# Patient Record
Sex: FEMALE | Race: WHITE | NOT HISPANIC OR LATINO | URBAN - METROPOLITAN AREA
[De-identification: names, ages, dates, MRNs, and addresses within clinical notes are randomized per-mention and may not be internally consistent; named-entity substitution may affect disease eponyms.]

---

## 2018-12-08 ENCOUNTER — INPATIENT (INPATIENT)
Facility: HOSPITAL | Age: 83
LOS: 5 days | Discharge: HOME | DRG: 394 | End: 2018-12-14
Attending: INTERNAL MEDICINE | Admitting: INTERNAL MEDICINE
Payer: MEDICARE

## 2018-12-08 VITALS
HEART RATE: 97 BPM | DIASTOLIC BLOOD PRESSURE: 83 MMHG | SYSTOLIC BLOOD PRESSURE: 132 MMHG | OXYGEN SATURATION: 98 % | RESPIRATION RATE: 18 BRPM | WEIGHT: 266.1 LBS | TEMPERATURE: 97 F

## 2018-12-08 DIAGNOSIS — F03.90 UNSPECIFIED DEMENTIA WITHOUT BEHAVIORAL DISTURBANCE: ICD-10-CM

## 2018-12-08 DIAGNOSIS — K62.5 HEMORRHAGE OF ANUS AND RECTUM: ICD-10-CM

## 2018-12-08 DIAGNOSIS — E11.9 TYPE 2 DIABETES MELLITUS WITHOUT COMPLICATIONS: ICD-10-CM

## 2018-12-08 LAB
ALBUMIN SERPL ELPH-MCNC: 3.2 G/DL — LOW (ref 3.3–5)
ALP SERPL-CCNC: 108 U/L — SIGNIFICANT CHANGE UP (ref 40–120)
ALT FLD-CCNC: 11 U/L — LOW (ref 12–78)
ANION GAP SERPL CALC-SCNC: 8 MMOL/L — SIGNIFICANT CHANGE UP (ref 5–17)
AST SERPL-CCNC: 8 U/L — LOW (ref 15–37)
BASOPHILS # BLD AUTO: 0.03 K/UL — SIGNIFICANT CHANGE UP (ref 0–0.2)
BASOPHILS NFR BLD AUTO: 0.3 % — SIGNIFICANT CHANGE UP (ref 0–2)
BILIRUB SERPL-MCNC: 0.3 MG/DL — SIGNIFICANT CHANGE UP (ref 0.2–1.2)
BLD GP AB SCN SERPL QL: SIGNIFICANT CHANGE UP
BUN SERPL-MCNC: 42 MG/DL — HIGH (ref 7–23)
CALCIUM SERPL-MCNC: 8.2 MG/DL — LOW (ref 8.5–10.1)
CHLORIDE SERPL-SCNC: 109 MMOL/L — HIGH (ref 96–108)
CO2 SERPL-SCNC: 24 MMOL/L — SIGNIFICANT CHANGE UP (ref 22–31)
CREAT SERPL-MCNC: 2 MG/DL — HIGH (ref 0.5–1.3)
EOSINOPHIL # BLD AUTO: 0.13 K/UL — SIGNIFICANT CHANGE UP (ref 0–0.5)
EOSINOPHIL NFR BLD AUTO: 1.4 % — SIGNIFICANT CHANGE UP (ref 0–6)
GLUCOSE SERPL-MCNC: 138 MG/DL — HIGH (ref 70–99)
HCT VFR BLD CALC: 34 % — LOW (ref 34.5–45)
HGB BLD-MCNC: 10.8 G/DL — LOW (ref 11.5–15.5)
IMM GRANULOCYTES NFR BLD AUTO: 0.3 % — SIGNIFICANT CHANGE UP (ref 0–1.5)
INR BLD: 1.12 RATIO — SIGNIFICANT CHANGE UP (ref 0.88–1.16)
LYMPHOCYTES # BLD AUTO: 1.77 K/UL — SIGNIFICANT CHANGE UP (ref 1–3.3)
LYMPHOCYTES # BLD AUTO: 19 % — SIGNIFICANT CHANGE UP (ref 13–44)
MCHC RBC-ENTMCNC: 31.8 GM/DL — LOW (ref 32–36)
MCHC RBC-ENTMCNC: 32.8 PG — SIGNIFICANT CHANGE UP (ref 27–34)
MCV RBC AUTO: 103.3 FL — HIGH (ref 80–100)
MONOCYTES # BLD AUTO: 0.72 K/UL — SIGNIFICANT CHANGE UP (ref 0–0.9)
MONOCYTES NFR BLD AUTO: 7.7 % — SIGNIFICANT CHANGE UP (ref 2–14)
NEUTROPHILS # BLD AUTO: 6.65 K/UL — SIGNIFICANT CHANGE UP (ref 1.8–7.4)
NEUTROPHILS NFR BLD AUTO: 71.3 % — SIGNIFICANT CHANGE UP (ref 43–77)
NRBC # BLD: 0 /100 WBCS — SIGNIFICANT CHANGE UP (ref 0–0)
OB PNL STL: POSITIVE
PLATELET # BLD AUTO: 173 K/UL — SIGNIFICANT CHANGE UP (ref 150–400)
POTASSIUM SERPL-MCNC: 4.8 MMOL/L — SIGNIFICANT CHANGE UP (ref 3.5–5.3)
POTASSIUM SERPL-SCNC: 4.8 MMOL/L — SIGNIFICANT CHANGE UP (ref 3.5–5.3)
PROT SERPL-MCNC: 8.1 G/DL — SIGNIFICANT CHANGE UP (ref 6–8.3)
PROTHROM AB SERPL-ACNC: 12.8 SEC — SIGNIFICANT CHANGE UP (ref 10–12.9)
RBC # BLD: 3.29 M/UL — LOW (ref 3.8–5.2)
RBC # FLD: 14.4 % — SIGNIFICANT CHANGE UP (ref 10.3–14.5)
SODIUM SERPL-SCNC: 141 MMOL/L — SIGNIFICANT CHANGE UP (ref 135–145)
WBC # BLD: 9.33 K/UL — SIGNIFICANT CHANGE UP (ref 3.8–10.5)
WBC # FLD AUTO: 9.33 K/UL — SIGNIFICANT CHANGE UP (ref 3.8–10.5)

## 2018-12-08 PROCEDURE — 71045 X-RAY EXAM CHEST 1 VIEW: CPT | Mod: 26

## 2018-12-08 PROCEDURE — 93010 ELECTROCARDIOGRAM REPORT: CPT

## 2018-12-08 PROCEDURE — 99285 EMERGENCY DEPT VISIT HI MDM: CPT

## 2018-12-08 RX ORDER — POTASSIUM CHLORIDE 20 MEQ
20 PACKET (EA) ORAL DAILY
Qty: 0 | Refills: 0 | Status: DISCONTINUED | OUTPATIENT
Start: 2018-12-08 | End: 2018-12-08

## 2018-12-08 RX ORDER — LEVOTHYROXINE SODIUM 125 MCG
1 TABLET ORAL
Qty: 0 | Refills: 0 | COMMUNITY

## 2018-12-08 RX ORDER — DEXTROSE 50 % IN WATER 50 %
12.5 SYRINGE (ML) INTRAVENOUS ONCE
Qty: 0 | Refills: 0 | Status: DISCONTINUED | OUTPATIENT
Start: 2018-12-08 | End: 2018-12-14

## 2018-12-08 RX ORDER — MEMANTINE HYDROCHLORIDE AND DONEPEZIL HYDROCHLORIDE 21; 10 MG/1; MG/1
1 CAPSULE ORAL
Qty: 0 | Refills: 0 | COMMUNITY

## 2018-12-08 RX ORDER — FOLIC ACID 0.8 MG
1 TABLET ORAL DAILY
Qty: 0 | Refills: 0 | Status: DISCONTINUED | OUTPATIENT
Start: 2018-12-08 | End: 2018-12-14

## 2018-12-08 RX ORDER — QUETIAPINE FUMARATE 200 MG/1
50 TABLET, FILM COATED ORAL
Qty: 0 | Refills: 0 | Status: DISCONTINUED | OUTPATIENT
Start: 2018-12-08 | End: 2018-12-14

## 2018-12-08 RX ORDER — QUETIAPINE FUMARATE 200 MG/1
1 TABLET, FILM COATED ORAL
Qty: 0 | Refills: 0 | COMMUNITY

## 2018-12-08 RX ORDER — DEXTROSE 50 % IN WATER 50 %
25 SYRINGE (ML) INTRAVENOUS ONCE
Qty: 0 | Refills: 0 | Status: DISCONTINUED | OUTPATIENT
Start: 2018-12-08 | End: 2018-12-14

## 2018-12-08 RX ORDER — SODIUM CHLORIDE 9 MG/ML
1000 INJECTION, SOLUTION INTRAVENOUS
Qty: 0 | Refills: 0 | Status: DISCONTINUED | OUTPATIENT
Start: 2018-12-08 | End: 2018-12-14

## 2018-12-08 RX ORDER — TOBRAMYCIN 0.3 %
1 DROPS OPHTHALMIC (EYE)
Qty: 0 | Refills: 0 | Status: DISCONTINUED | OUTPATIENT
Start: 2018-12-08 | End: 2018-12-14

## 2018-12-08 RX ORDER — INSULIN LISPRO 100/ML
VIAL (ML) SUBCUTANEOUS
Qty: 0 | Refills: 0 | Status: DISCONTINUED | OUTPATIENT
Start: 2018-12-08 | End: 2018-12-14

## 2018-12-08 RX ORDER — FERROUS SULFATE 325(65) MG
1 TABLET ORAL
Qty: 0 | Refills: 0 | COMMUNITY

## 2018-12-08 RX ORDER — DEXTROSE 50 % IN WATER 50 %
15 SYRINGE (ML) INTRAVENOUS ONCE
Qty: 0 | Refills: 0 | Status: DISCONTINUED | OUTPATIENT
Start: 2018-12-08 | End: 2018-12-14

## 2018-12-08 RX ORDER — ALPRAZOLAM 0.25 MG
0.25 TABLET ORAL THREE TIMES A DAY
Qty: 0 | Refills: 0 | Status: DISCONTINUED | OUTPATIENT
Start: 2018-12-08 | End: 2018-12-13

## 2018-12-08 RX ORDER — ALLOPURINOL 300 MG
1 TABLET ORAL
Qty: 0 | Refills: 0 | COMMUNITY

## 2018-12-08 RX ORDER — CITALOPRAM 10 MG/1
1 TABLET, FILM COATED ORAL
Qty: 0 | Refills: 0 | COMMUNITY

## 2018-12-08 RX ORDER — GLUCAGON INJECTION, SOLUTION 0.5 MG/.1ML
1 INJECTION, SOLUTION SUBCUTANEOUS ONCE
Qty: 0 | Refills: 0 | Status: DISCONTINUED | OUTPATIENT
Start: 2018-12-08 | End: 2018-12-14

## 2018-12-08 RX ORDER — QUETIAPINE FUMARATE 200 MG/1
200 TABLET, FILM COATED ORAL AT BEDTIME
Qty: 0 | Refills: 0 | Status: DISCONTINUED | OUTPATIENT
Start: 2018-12-08 | End: 2018-12-10

## 2018-12-08 RX ORDER — DULAGLUTIDE 4.5 MG/.5ML
0 INJECTION, SOLUTION SUBCUTANEOUS
Qty: 0 | Refills: 0 | COMMUNITY

## 2018-12-08 RX ORDER — SIMVASTATIN 20 MG/1
1 TABLET, FILM COATED ORAL
Qty: 0 | Refills: 0 | COMMUNITY

## 2018-12-08 RX ORDER — LISINOPRIL 2.5 MG/1
1 TABLET ORAL
Qty: 0 | Refills: 0 | COMMUNITY

## 2018-12-08 RX ORDER — RISPERIDONE 4 MG/1
0.25 TABLET ORAL
Qty: 0 | Refills: 0 | Status: DISCONTINUED | OUTPATIENT
Start: 2018-12-08 | End: 2018-12-14

## 2018-12-08 RX ORDER — FUROSEMIDE 40 MG
1 TABLET ORAL
Qty: 0 | Refills: 0 | COMMUNITY

## 2018-12-08 RX ADMIN — RISPERIDONE 0.25 MILLIGRAM(S): 4 TABLET ORAL at 17:55

## 2018-12-08 RX ADMIN — Medication 1 DROP(S): at 17:55

## 2018-12-08 RX ADMIN — QUETIAPINE FUMARATE 200 MILLIGRAM(S): 200 TABLET, FILM COATED ORAL at 22:31

## 2018-12-08 NOTE — ED PROVIDER NOTE - OBJECTIVE STATEMENT
83 female presents to ER with daughters and home aide with report of dark blood in the stool. Patient had BM today and aide noted the blood in the toilet. Patient has dementia, poor historian, history obtained from the daughter.

## 2018-12-08 NOTE — ED ADULT NURSE REASSESSMENT NOTE - NS ED NURSE REASSESS COMMENT FT1
Plan for medicine admission. Patient made aware. Awaiting admission assessment and orders. Awaiting bed assignment. Safety maintained.

## 2018-12-08 NOTE — H&P ADULT - NSHPPHYSICALEXAM_GEN_ALL_CORE
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

## 2018-12-08 NOTE — ED ADULT NURSE NOTE - OBJECTIVE STATEMENT
83 year old female presents to the ED complaining of rectal bleeding. Patient lives at home with daughters and home aide. As per family and patient, dark blood noted in the stool today. Patient with last bowel movement today prior to arrival to ED. Abdomen noted to be distended and firm. Patient denies pain, nontender on exam. Denies nausea/vomiting. Denies fever/chills. Patient with history of dementia, poor historian. History obtained by family. Denies change in urinary pattern.

## 2018-12-08 NOTE — ED ADULT NURSE NOTE - NSIMPLEMENTINTERV_GEN_ALL_ED
Implemented All Fall Risk Interventions:  [place] to call system. Call bell, personal items and telephone within reach. Instruct patient to call for assistance. Room bathroom lighting operational. Non-slip footwear when patient is off stretcher. Physically safe environment: no spills, clutter or unnecessary equipment. Stretcher in lowest position, wheels locked, appropriate side rails in place. Provide visual cue, wrist band, yellow gown, etc. Monitor gait and stability. Monitor for mental status changes and reorient to person, place, and time. Review medications for side effects contributing to fall risk. Reinforce activity limits and safety measures with patient and family.

## 2018-12-08 NOTE — PROVIDER CONTACT NOTE (OTHER) - SITUATION
MD called to be made aware of patient home doses of Seroquil and that patient in sinus rhythm with first degree heart block. Also wanted diet order

## 2018-12-08 NOTE — PROVIDER CONTACT NOTE (OTHER) - ACTION/TREATMENT ORDERED:
1. MD gave telephone orders for Seroquil 50mg in am and lunch   2. Seroquil 200mg oral at bedtime  MD ordered to change diet and no new orders for tele result

## 2018-12-09 LAB
ANION GAP SERPL CALC-SCNC: 10 MMOL/L — SIGNIFICANT CHANGE UP (ref 5–17)
BUN SERPL-MCNC: 45 MG/DL — HIGH (ref 7–23)
CALCIUM SERPL-MCNC: 8.2 MG/DL — LOW (ref 8.5–10.1)
CHLORIDE SERPL-SCNC: 109 MMOL/L — HIGH (ref 96–108)
CO2 SERPL-SCNC: 23 MMOL/L — SIGNIFICANT CHANGE UP (ref 22–31)
CREAT SERPL-MCNC: 2.2 MG/DL — HIGH (ref 0.5–1.3)
GLUCOSE SERPL-MCNC: 98 MG/DL — SIGNIFICANT CHANGE UP (ref 70–99)
HBA1C BLD-MCNC: 6.3 % — HIGH (ref 4–5.6)
HCT VFR BLD CALC: 32.6 % — LOW (ref 34.5–45)
HGB BLD-MCNC: 10.3 G/DL — LOW (ref 11.5–15.5)
MCHC RBC-ENTMCNC: 31.6 GM/DL — LOW (ref 32–36)
MCHC RBC-ENTMCNC: 33.1 PG — SIGNIFICANT CHANGE UP (ref 27–34)
MCV RBC AUTO: 104.8 FL — HIGH (ref 80–100)
NRBC # BLD: 0 /100 WBCS — SIGNIFICANT CHANGE UP (ref 0–0)
PLATELET # BLD AUTO: 140 K/UL — LOW (ref 150–400)
POTASSIUM SERPL-MCNC: 4.5 MMOL/L — SIGNIFICANT CHANGE UP (ref 3.5–5.3)
POTASSIUM SERPL-SCNC: 4.5 MMOL/L — SIGNIFICANT CHANGE UP (ref 3.5–5.3)
RBC # BLD: 3.11 M/UL — LOW (ref 3.8–5.2)
RBC # FLD: 14.4 % — SIGNIFICANT CHANGE UP (ref 10.3–14.5)
SODIUM SERPL-SCNC: 142 MMOL/L — SIGNIFICANT CHANGE UP (ref 135–145)
TSH SERPL-MCNC: 3.41 UIU/ML — SIGNIFICANT CHANGE UP (ref 0.36–3.74)
WBC # BLD: 7.08 K/UL — SIGNIFICANT CHANGE UP (ref 3.8–10.5)
WBC # FLD AUTO: 7.08 K/UL — SIGNIFICANT CHANGE UP (ref 3.8–10.5)

## 2018-12-09 RX ADMIN — Medication 0.25 MILLIGRAM(S): at 21:41

## 2018-12-09 RX ADMIN — QUETIAPINE FUMARATE 50 MILLIGRAM(S): 200 TABLET, FILM COATED ORAL at 12:35

## 2018-12-09 RX ADMIN — Medication 1 DROP(S): at 12:35

## 2018-12-09 RX ADMIN — Medication 1 DROP(S): at 17:31

## 2018-12-09 RX ADMIN — Medication 1 MILLIGRAM(S): at 12:35

## 2018-12-09 RX ADMIN — QUETIAPINE FUMARATE 50 MILLIGRAM(S): 200 TABLET, FILM COATED ORAL at 09:07

## 2018-12-09 RX ADMIN — Medication 0.25 MILLIGRAM(S): at 05:41

## 2018-12-09 RX ADMIN — RISPERIDONE 0.25 MILLIGRAM(S): 4 TABLET ORAL at 05:41

## 2018-12-09 RX ADMIN — Medication 1 DROP(S): at 01:55

## 2018-12-09 RX ADMIN — QUETIAPINE FUMARATE 200 MILLIGRAM(S): 200 TABLET, FILM COATED ORAL at 21:41

## 2018-12-09 RX ADMIN — RISPERIDONE 0.25 MILLIGRAM(S): 4 TABLET ORAL at 17:31

## 2018-12-09 RX ADMIN — Medication 1 DROP(S): at 05:41

## 2018-12-09 NOTE — PROGRESS NOTE ADULT - SUBJECTIVE AND OBJECTIVE BOX
pt seen on  rounds family  at  bedside  no  further  bleeding  noted lungs clear    cor  regular rate abdomen obese  extremities  no  edema labs  pending

## 2018-12-10 DIAGNOSIS — E11.21 TYPE 2 DIABETES MELLITUS WITH DIABETIC NEPHROPATHY: ICD-10-CM

## 2018-12-10 DIAGNOSIS — I15.0 RENOVASCULAR HYPERTENSION: ICD-10-CM

## 2018-12-10 DIAGNOSIS — E03.9 HYPOTHYROIDISM, UNSPECIFIED: ICD-10-CM

## 2018-12-10 DIAGNOSIS — F01.50 VASCULAR DEMENTIA WITHOUT BEHAVIORAL DISTURBANCE: ICD-10-CM

## 2018-12-10 DIAGNOSIS — N18.4 CHRONIC KIDNEY DISEASE, STAGE 4 (SEVERE): ICD-10-CM

## 2018-12-10 DIAGNOSIS — F31.12 BIPOLAR DISORDER, CURRENT EPISODE MANIC WITHOUT PSYCHOTIC FEATURES, MODERATE: ICD-10-CM

## 2018-12-10 DIAGNOSIS — M15.9 POLYOSTEOARTHRITIS, UNSPECIFIED: ICD-10-CM

## 2018-12-10 DIAGNOSIS — E11.59 TYPE 2 DIABETES MELLITUS WITH OTHER CIRCULATORY COMPLICATIONS: ICD-10-CM

## 2018-12-10 LAB
HCT VFR BLD CALC: 32.1 % — LOW (ref 34.5–45)
HGB BLD-MCNC: 10.2 G/DL — LOW (ref 11.5–15.5)
MCHC RBC-ENTMCNC: 31.8 GM/DL — LOW (ref 32–36)
MCHC RBC-ENTMCNC: 32.9 PG — SIGNIFICANT CHANGE UP (ref 27–34)
MCV RBC AUTO: 103.5 FL — HIGH (ref 80–100)
NRBC # BLD: 0 /100 WBCS — SIGNIFICANT CHANGE UP (ref 0–0)
PLATELET # BLD AUTO: 134 K/UL — LOW (ref 150–400)
RBC # BLD: 3.1 M/UL — LOW (ref 3.8–5.2)
RBC # FLD: 14.1 % — SIGNIFICANT CHANGE UP (ref 10.3–14.5)
WBC # BLD: 8.67 K/UL — SIGNIFICANT CHANGE UP (ref 3.8–10.5)
WBC # FLD AUTO: 8.67 K/UL — SIGNIFICANT CHANGE UP (ref 3.8–10.5)

## 2018-12-10 PROCEDURE — 93010 ELECTROCARDIOGRAM REPORT: CPT

## 2018-12-10 PROCEDURE — 99221 1ST HOSP IP/OBS SF/LOW 40: CPT

## 2018-12-10 RX ORDER — SODIUM CHLORIDE 9 MG/ML
1000 INJECTION INTRAMUSCULAR; INTRAVENOUS; SUBCUTANEOUS
Qty: 0 | Refills: 0 | Status: DISCONTINUED | OUTPATIENT
Start: 2018-12-10 | End: 2018-12-14

## 2018-12-10 RX ORDER — SOD SULF/SODIUM/NAHCO3/KCL/PEG
1 SOLUTION, RECONSTITUTED, ORAL ORAL EVERY 4 HOURS
Qty: 0 | Refills: 0 | Status: COMPLETED | OUTPATIENT
Start: 2018-12-11 | End: 2018-12-11

## 2018-12-10 RX ORDER — LANOLIN ALCOHOL/MO/W.PET/CERES
3 CREAM (GRAM) TOPICAL AT BEDTIME
Qty: 0 | Refills: 0 | Status: DISCONTINUED | OUTPATIENT
Start: 2018-12-10 | End: 2018-12-14

## 2018-12-10 RX ORDER — PANTOPRAZOLE SODIUM 20 MG/1
40 TABLET, DELAYED RELEASE ORAL DAILY
Qty: 0 | Refills: 0 | Status: DISCONTINUED | OUTPATIENT
Start: 2018-12-10 | End: 2018-12-14

## 2018-12-10 RX ORDER — QUETIAPINE FUMARATE 200 MG/1
100 TABLET, FILM COATED ORAL AT BEDTIME
Qty: 0 | Refills: 0 | Status: DISCONTINUED | OUTPATIENT
Start: 2018-12-10 | End: 2018-12-14

## 2018-12-10 RX ADMIN — Medication 1 DROP(S): at 17:41

## 2018-12-10 RX ADMIN — Medication 1 DROP(S): at 00:00

## 2018-12-10 RX ADMIN — Medication 1 DROP(S): at 12:06

## 2018-12-10 RX ADMIN — Medication 0.25 MILLIGRAM(S): at 06:53

## 2018-12-10 RX ADMIN — Medication 0.25 MILLIGRAM(S): at 14:27

## 2018-12-10 RX ADMIN — Medication 3 MILLIGRAM(S): at 21:39

## 2018-12-10 RX ADMIN — QUETIAPINE FUMARATE 50 MILLIGRAM(S): 200 TABLET, FILM COATED ORAL at 12:06

## 2018-12-10 RX ADMIN — Medication 1 MILLIGRAM(S): at 12:06

## 2018-12-10 RX ADMIN — QUETIAPINE FUMARATE 50 MILLIGRAM(S): 200 TABLET, FILM COATED ORAL at 09:07

## 2018-12-10 RX ADMIN — SODIUM CHLORIDE 50 MILLILITER(S): 9 INJECTION INTRAMUSCULAR; INTRAVENOUS; SUBCUTANEOUS at 17:40

## 2018-12-10 RX ADMIN — Medication 0.25 MILLIGRAM(S): at 21:39

## 2018-12-10 RX ADMIN — RISPERIDONE 0.25 MILLIGRAM(S): 4 TABLET ORAL at 06:53

## 2018-12-10 RX ADMIN — QUETIAPINE FUMARATE 100 MILLIGRAM(S): 200 TABLET, FILM COATED ORAL at 22:18

## 2018-12-10 RX ADMIN — Medication 1 DROP(S): at 06:53

## 2018-12-10 RX ADMIN — RISPERIDONE 0.25 MILLIGRAM(S): 4 TABLET ORAL at 17:41

## 2018-12-10 NOTE — PHYSICAL THERAPY INITIAL EVALUATION ADULT - ADDITIONAL COMMENTS
pt is a poor historian. Pt states she lives in a private home, was ambulating with a wheeled walker andhas stairs in her home.

## 2018-12-10 NOTE — PHYSICAL THERAPY INITIAL EVALUATION ADULT - GAIT TRAINING, PT EVAL
In 3-5 sessions pt will amb 75 feet x 2 with a rolling walker indep. and climb up and down 6 steps with one handrail independently

## 2018-12-10 NOTE — PHYSICAL THERAPY INITIAL EVALUATION ADULT - RANGE OF MOTION EXAMINATION, REHAB EVAL
bilateral lower extremity ROM was WFL (within functional limits)/bilateral upper extremity ROM was WFL (within functional limits)/limited left shoulder flexion and abduction to 45 degrees/deficits as listed below

## 2018-12-10 NOTE — CONSULT NOTE ADULT - ASSESSMENT
82 yo f pmx htn, dm, hypothyroidism, ckd, varicoses vein, dementia admitted for rectal bleeding.  Called for 2 episodes of sustained tachycardia into 130/140s early last night and this morning.  Have now resolved, HR now steady in 95s.    - Tachycardia resolved, episodes possibly related to current bleed, HR now stable, in sinus with 1st degree block, continue telemonitoring, trend H&H  - Continue to hold Plavix in setting of bleed  - No clear evidence of acute ischemia, continue statin  - Non pitting edema on left ankle/foot, has had in the past, on lasix at home, recommended restarting lasix  - No acute changes on EKG compared to previous, tele strip reviewed during episodes _____  - No previous echo done, never seen cardiologist outpt, no significant cardiac hx  - BP well controlled, continue lisinopril, monitor routine hemodynamics.  - Monitor and replete lytes, keep K>4, Mg>2.  - Pt has no active ischemia, decompensated HF, unstable arrythmia, and in the setting of low risk procedure, patient is optimized from cardiovascular standpoint for colonoscopy if planned with routine hemodynamic monitoring.   - Other cardiovascular workup will depend on clinical course.  - All other workup per primary team.  - Will follow. 84 yo f pmx htn, dm, hypothyroidism, ckd, varicoses vein, dementia admitted for rectal bleeding.  Called for 2 episodes of sustained tachycardia into 130/140s early last night and this morning.  Have now resolved, HR now steady in 95s.    - Tachycardia resolved, episodes possibly related to current bleed, HR now stable, in sinus with 1st degree block, continue telemonitoring, trend H&H  - Continue to hold Plavix in setting of bleed  - No clear evidence of acute ischemia, continue statin  - Non pitting edema on left ankle/foot, has had in the past, on lasix at home, recommended restarting lasix  - No acute changes on EKG compared to previous, tele strip reviewed during episodes of tachy  - No previous echo done, never seen cardiologist outpt, no significant cardiac hx  - BP well controlled, continue lisinopril, monitor routine hemodynamics.  - Monitor and replete lytes, keep K>4, Mg>2.  - Other cardiovascular workup will depend on clinical course.  - All other workup per primary team.  - Will follow. 84 yo f pmx htn, dm, hypothyroidism, ckd, varicoses vein, dementia admitted for rectal bleeding.  Called for 2 episodes of sustained tachycardia into 130/140s early last night and this morning.  Have now resolved, HR now steady in 95s.    - Tachycardia resolved, episodes possibly related to current bleed, HR now stable, in sinus with 1st degree block, continue telemonitoring, trend H&H  - Continue to hold Plavix in setting of bleed  - No clear evidence of acute ischemia, continue statin  - Non pitting edema on left ankle/foot, has had in the past, on lasix at home, recommended restarting lasix  - No acute changes on EKG compared to previous, tele strip reviewed during episodes of tachy  - No previous echo done, never seen cardiologist outpt, no significant cardiac hx  - BP well controlled, continue lisinopril, monitor routine hemodynamics.  - Monitor and replete lytes, keep K>4, Mg>2.  - Pt has no active ischemia, decompensated HF, unstable arrythmia, and in the setting of low risk procedure, patient is optimized from cardiovascular standpoint to proceed with planned colonoscopy with routine hemodynamic monitoring.   - Other cardiovascular workup will depend on clinical course.  - All other workup per primary team.  - Will follow. 82 yo f pmx htn, dm, hypothyroidism, ckd, varicoses vein, dementia admitted for rectal bleeding.  Called for 2 episodes of sustained tachycardia into 130/140s early last night and this morning.  Have now resolved, HR now steady in 95s.    - Tachycardia with slow uptrend/downtrend likely sinus tach, episodes possibly related to current bleed vs dehydration, continue telemonitoring, trend H&H, will add gentle IVF overnight  - Continue to hold Plavix in setting of bleed  - No clear evidence of acute ischemia, continue statin  - Non pitting edema on left ankle/foot, has had in the past, on lasix at home, recommended restarting lasix  - No acute changes on EKG compared to previous, tele strip reviewed during episodes of tachy  - No previous echo done, never seen cardiologist outpt, no other significant cardiac hx  - BP well controlled, monitor routine hemodynamics, continue lisinopril    - Monitor and replete lytes, keep K>4, Mg>2.  - Pt has no active ischemia, decompensated HF, unstable arrythmia, or obstructing valvular disease and in the setting of low risk procedure, patient is optimized from cardiovascular standpoint to proceed with planned colonoscopy with routine hemodynamic monitoring.   - Other cardiovascular workup will depend on clinical course.  - All other workup per primary team.  - Will follow. 82 yo f pmx htn, dm, hypothyroidism, ckd, varicoses vein, dementia admitted for rectal bleeding.  Called for 2 episodes of sustained tachycardia into 130/140s early last night and this morning.  Have now resolved, HR now steady in 95s.    - Tachycardia with slow uptrend/downtrend likely sinus tach, episodes possibly related to current bleed vs dehydration, continue telemonitoring, trend H&H, will add gentle IVF overnight, encourage PO intake  - Continue to hold Plavix in setting of bleed  - No clear evidence of acute ischemia, continue statin  - Non pitting edema on left ankle/foot, has had in the past, on lasix at home, continue to hold for now in setting of dehydration  - No acute changes on EKG compared to previous, tele strip reviewed during episodes of tachy  - No previous echo done, never seen cardiologist outpt, no other significant cardiac hx  - BP well controlled, monitor routine hemodynamics, continue lisinopril    - Monitor and replete lytes, keep K>4, Mg>2.  - Pt has no active ischemia, decompensated HF, unstable arrythmia, or obstructing valvular disease and in the setting of low risk procedure, patient is optimized from cardiovascular standpoint to proceed with planned colonoscopy with routine hemodynamic monitoring.   - Other cardiovascular workup will depend on clinical course.  - All other workup per primary team.  - Will follow. 82 yo f pmx htn, dm, hypothyroidism, ckd, varicoses vein, dementia admitted for rectal bleeding.  Called for 2 episodes of sustained tachycardia into 130/140s early last night and this morning.  Have now resolved, HR now steady in 95s.    - Tachycardia with slow uptrend/downtrend likely sinus tach, episodes possibly related to current bleed vs dehydration, continue telemonitoring, trend H&H, will add gentle IVF overnight, encourage PO intake  - Monitor closely for signs of volume overload  - Continue to hold Plavix in setting of bleed  - No clear evidence of acute ischemia, continue statin  - Non pitting edema on left ankle/foot, has had in the past, on lasix at home, continue to hold for now in setting of dehydration  - No acute changes on EKG compared to previous, tele strip reviewed during episodes of tachy  - No previous echo done, never seen cardiologist outpt, no other significant cardiac hx  - BP well controlled, monitor routine hemodynamics, continue lisinopril    - Monitor and replete lytes, keep K>4, Mg>2.  - Pt has no active ischemia, decompensated HF, unstable arrythmia, or obstructing valvular disease and in the setting of low risk procedure, patient is optimized from cardiovascular standpoint to proceed with planned colonoscopy with routine hemodynamic monitoring.   - Other cardiovascular workup will depend on clinical course.  - All other workup per primary team.  - Will follow.

## 2018-12-10 NOTE — PROGRESS NOTE ADULT - SUBJECTIVE AND OBJECTIVE BOX
PCP  Subjective:   in bed , awake , alert , talking alot , ate BF       Objective:   Vital Signs Last 24 Hrs  T(C): 36.7 (12-10-18 @ 05:48), Max: 37.1 (12-09-18 @ 14:01)  T(F): 98 (12-10-18 @ 05:48), Max: 98.7 (12-09-18 @ 14:01)  HR: 84 (12-10-18 @ 05:48) (84 - 99)  BP: 94/62 (12-10-18 @ 05:48) (87/60 - 106/69)  BP(mean): --  RR: 16 (12-10-18 @ 05:48) (16 - 17)  SpO2: 95% (12-10-18 @ 05:48) (90% - 95%)  Daily     Daily     GENERAL:  wdwn obese female , awake , alert responsive , but confused   EYES: eomi michele  NECK: supple no mass  CHEST/LUNG: clear , but poor air movement  HEART: s1 s2 regular   ABDOMEN:  soft nt obese , + bs  EXTREMITIES: no edema   SKIN:  warm ,   CNS:  awake , alert , non focal , confused , weak , immobility issues due to morbid obesity    Allergies: Allergies    No Known Allergies    Intolerances        Home Medications:  allopurinol 100 mg oral tablet: 1 tab(s) orally once a day (08 Dec 2018 19:32)  ALPRAZolam 0.25 mg oral tablet: 1 tab(s) orally once a day (08 Dec 2018 19:32)  citalopram 10 mg oral tablet: 1 tab(s) orally once a day (08 Dec 2018 19:32)  clopidogrel 75 mg oral tablet: 1 tab(s) orally once a day (08 Dec 2018 19:32)  ferrous sulfate (as elemental iron) 45 mg oral tablet, extended release: 1 tab(s) orally once a day (08 Dec 2018 19:32)  folic acid 1 mg oral tablet: 1 tab(s) orally once a day (08 Dec 2018 19:32)  furosemide 20 mg oral tablet: 1 tab(s) orally once a day (08 Dec 2018 19:32)  Klor-Con 20 mEq oral powder for reconstitution:  orally once a day (08 Dec 2018 19:32)  levothyroxine 100 mcg (0.1 mg) oral tablet: 1 tab(s) orally once a day (08 Dec 2018 19:32)  lisinopril 10 mg oral tablet: 1 tab(s) orally once a day (08 Dec 2018 19:32)  Namzaric 28 mg-10 mg oral capsule, extended release: 1 cap(s) orally once a day (08 Dec 2018 19:32)  QUEtiapine 200 mg oral tablet: 1 tab(s) orally once a day (at bedtime) (08 Dec 2018 19:32)  QUEtiapine 50 mg oral tablet: 1 tab(s) orally 2 times a day with breakfast and lunch (08 Dec 2018 19:32)  simvastatin 40 mg oral tablet: 1 tab(s) orally once a day (at bedtime) (08 Dec 2018 19:32)  tobramycin ophthalmic 0.3% ophthalmic solution: 1 drop(s) to each affected eye 4 times a day (08 Dec 2018 19:32)  Trulicity Pen 0.75 mg/0.5 mL subcutaneous solution: subcutaneous once a week (08 Dec 2018 19:32)  Vitamin B-100 oral tablet: 1 tab(s) orally once a day (08 Dec 2018 19:32)    Medications:   ALPRAZolam 0.25 milliGRAM(s) Oral three times a day  dextrose 40% Gel 15 Gram(s) Oral once PRN  dextrose 5%. 1000 milliLiter(s) IV Continuous <Continuous>  dextrose 50% Injectable 12.5 Gram(s) IV Push once  dextrose 50% Injectable 25 Gram(s) IV Push once  dextrose 50% Injectable 25 Gram(s) IV Push once  folic acid 1 milliGRAM(s) Oral daily  glucagon  Injectable 1 milliGRAM(s) IntraMuscular once PRN  insulin lispro (HumaLOG) corrective regimen sliding scale   SubCutaneous three times a day before meals  QUEtiapine 200 milliGRAM(s) Oral at bedtime  QUEtiapine 50 milliGRAM(s) Oral two times a day  risperiDONE   Tablet 0.25 milliGRAM(s) Oral two times a day  tobramycin 0.3% Ophthalmic Solution 1 Drop(s) Both EYES four times a day      LABS:                        10.2   8.67  )-----------( 134      ( 10 Dec 2018 08:28 )             32.1     12-09    142  |  109<H>  |  45<H>  ----------------------------<  98  4.5   |  23  |  2.20<H>    Ca    8.2<L>      09 Dec 2018 09:51    TPro  8.1  /  Alb  3.2<L>  /  TBili  0.3  /  DBili  x   /  AST  8<L>  /  ALT  11<L>  /  AlkPhos  108  12-08    PT/INR - ( 08 Dec 2018 15:05 )   PT: 12.8 sec;   INR: 1.12 ratio           12-08 @ 15:05  INR 1.12        CAPILLARY BLOOD GLUCOSE      POCT Blood Glucose.: 111 mg/dL (10 Dec 2018 08:03)  POCT Blood Glucose.: 142 mg/dL (09 Dec 2018 16:29)  POCT Blood Glucose.: 148 mg/dL (09 Dec 2018 12:05)          RECENT CULTURES:

## 2018-12-10 NOTE — PROGRESS NOTE ADULT - ASSESSMENT
· Chief Complaint: The patient is a 83y Female complaining of rectal bleeding.	  · Unable to Obtain: Dementia	  · HPI Objective Statement: 83 female presents to ER with daughters and home aide with report of dark blood in the stool. Patient had BM today and aide noted the blood in the toilet. Patient has dementia, poor historian, history obtained from the daughter.	    PAST MEDICAL/SURGICAL/FAMILY/SOCIAL HISTORY:    Past Medical History:  Diabetes mellitus    Hypertension    Hypothyroidism.  obesity  Phlebitis   varicose veins     79 yo f,  , lives with 24/7 HHA and dtr helps out , has hx of obesity , diabetes mellitus type 2 , ckd stage 4,  bipolar , and anxiety , and varicouse veins , who presents with rectal bleed , no abd pain , no nausea no vomiting ,   patient is dnr and dni per [facility]ols and ACP reviewed with dtr ,   admit and monitor for gi bleed and work up

## 2018-12-10 NOTE — CONSULT NOTE ADULT - PROBLEM SELECTOR RECOMMENDATION 9
A/P  monitor in and out  ppi once a day  check cbc  transfuse as needed  may need egd and colonoscopy to be done will d/w daugther

## 2018-12-10 NOTE — CONSULT NOTE ADULT - SUBJECTIVE AND OBJECTIVE BOX
[facility]         Preeti Ortiz, Keyonna, Eugenia, Pauly, Medardo, Carrington        [phone] (office)    CHIEF COMPLAINT: Patient is a 83y old  Female who presents with a chief complaint of rectal  bleeding (10 Dec 2018 10:25)      HPI:  84 yo f pmx htn, dm, hypothyroidism, ckd, varicoses vein, dementia presents for rectal bleeding. Pt baseline dementia, alert and oriented to self, hx obtained from daughter via phone. Per daughter, pt has been on a diet pill (does not recall name) for about 5 weeks, lost weight but quickly gained it back.  Pt has full time aid at home, this past sat, aid noticed blood in the stool.  Per daughter pt has been complaining of left ankle/foot swelling, has had in past but gotten worse this past month.  Per daughter no other significant cardiac hx besides htn, never seen cardiologist outpt, does not recall echocardiogram in the past. Denies cp, sob, palpitations, abdominal pain, n/v, headaches, dizziness.      PAST MEDICAL & SURGICAL HISTORY:  Hypothyroidism  Hypertension  Diabetes mellitus  S/P knee replacement, bilateral      SOCIAL HISTORY: No active tobacco, alcohol or illicit drug use.    FAMILY HISTORY:  No pertinent family history in first degree relatives    Home Medications:  allopurinol 100 mg oral tablet: 1 tab(s) orally once a day (08 Dec 2018 19:32)  ALPRAZolam 0.25 mg oral tablet: 1 tab(s) orally once a day (08 Dec 2018 19:32)  citalopram 10 mg oral tablet: 1 tab(s) orally once a day (08 Dec 2018 19:32)  clopidogrel 75 mg oral tablet: 1 tab(s) orally once a day (08 Dec 2018 19:32)  ferrous sulfate (as elemental iron) 45 mg oral tablet, extended release: 1 tab(s) orally once a day (08 Dec 2018 19:32)  folic acid 1 mg oral tablet: 1 tab(s) orally once a day (08 Dec 2018 19:32)  furosemide 20 mg oral tablet: 1 tab(s) orally once a day (08 Dec 2018 19:32)  Klor-Con 20 mEq oral powder for reconstitution:  orally once a day (08 Dec 2018 19:32)  levothyroxine 100 mcg (0.1 mg) oral tablet: 1 tab(s) orally once a day (08 Dec 2018 19:32)  lisinopril 10 mg oral tablet: 1 tab(s) orally once a day (08 Dec 2018 19:32)  Namzaric 28 mg-10 mg oral capsule, extended release: 1 cap(s) orally once a day (08 Dec 2018 19:32)  QUEtiapine 200 mg oral tablet: 1 tab(s) orally once a day (at bedtime) (08 Dec 2018 19:32)  QUEtiapine 50 mg oral tablet: 1 tab(s) orally 2 times a day with breakfast and lunch (08 Dec 2018 19:32)  simvastatin 40 mg oral tablet: 1 tab(s) orally once a day (at bedtime) (08 Dec 2018 19:32)  tobramycin ophthalmic 0.3% ophthalmic solution: 1 drop(s) to each affected eye 4 times a day (08 Dec 2018 19:32)  Trulicity Pen 0.75 mg/0.5 mL subcutaneous solution: subcutaneous once a week (08 Dec 2018 19:32)  Vitamin B-100 oral tablet: 1 tab(s) orally once a day (08 Dec 2018 19:32)        MEDICATIONS  (STANDING):  ALPRAZolam 0.25 milliGRAM(s) Oral three times a day  dextrose 5%. 1000 milliLiter(s) (50 mL/Hr) IV Continuous <Continuous>  dextrose 50% Injectable 12.5 Gram(s) IV Push once  dextrose 50% Injectable 25 Gram(s) IV Push once  dextrose 50% Injectable 25 Gram(s) IV Push once  folic acid 1 milliGRAM(s) Oral daily  insulin lispro (HumaLOG) corrective regimen sliding scale   SubCutaneous three times a day before meals  melatonin 3 milliGRAM(s) Oral at bedtime  pantoprazole  Injectable 40 milliGRAM(s) IV Push daily  QUEtiapine 50 milliGRAM(s) Oral two times a day  QUEtiapine 100 milliGRAM(s) Oral at bedtime  risperiDONE   Tablet 0.25 milliGRAM(s) Oral two times a day  tobramycin 0.3% Ophthalmic Solution 1 Drop(s) Both EYES four times a day    MEDICATIONS  (PRN):  dextrose 40% Gel 15 Gram(s) Oral once PRN Blood Glucose LESS THAN 70 milliGRAM(s)/deciliter  glucagon  Injectable 1 milliGRAM(s) IntraMuscular once PRN Glucose LESS THAN 70 milligrams/deciliter      Allergies    No Known Allergies    Intolerances        REVIEW OF SYSTEMS: Is negative for eye, ENT, GI, , allergic, dermatologic, musculoskeletal and neurologic, except as described above.    VITAL SIGNS:   Vital Signs Last 24 Hrs  T(C): 36.4 (10 Dec 2018 14:57), Max: 36.7 (10 Dec 2018 05:48)  T(F): 97.5 (10 Dec 2018 14:57), Max: 98 (10 Dec 2018 05:48)  HR: 96 (10 Dec 2018 14:57) (84 - 99)  BP: 136/72 (10 Dec 2018 14:57) (87/60 - 136/72)  BP(mean): --  RR: 17 (10 Dec 2018 14:57) (16 - 17)  SpO2: 96% (10 Dec 2018 14:57) (94% - 96%)    I&O's Summary    10 Dec 2018 07:01  -  10 Dec 2018 15:38  --------------------------------------------------------  IN: 240 mL / OUT: 0 mL / NET: 240 mL        PHYSICAL EXAM:  Constitutional: NAD, awake and alert to self, well-developed, obese  Eyes: EOMI, no oral cyanosis, conjunctivae clear, anicteric  Pulmonary: Non-labored, breath sounds are clear bilaterally, no wheezing  Cardiovascular: regular S1 and S2, normal rate. No murmur  Gastrointestinal: Bowel Sounds present, soft, nontender  Lymph: left ankle/foot +2 nonpitting edema  Neurological: Alert to self, strength and sensitivity are grossly intact  Skin: Warm, well-perfused  Psych: baseline dementia, pleasant    LABS: All Labs Reviewed:                        10.2   8.67  )-----------( 134      ( 10 Dec 2018 08:28 )             32.1                         10.3   7.08  )-----------( 140      ( 09 Dec 2018 09:51 )             32.6                         10.8   9.33  )-----------( 173      ( 08 Dec 2018 15:05 )             34.0     09 Dec 2018 09:51    142    |  109    |  45     ----------------------------<  98     4.5     |  23     |  2.20   08 Dec 2018 15:05    141    |  109    |  42     ----------------------------<  138    4.8     |  24     |  2.00     Ca    8.2        09 Dec 2018 09:51  Ca    8.2        08 Dec 2018 15:05    TPro  8.1    /  Alb  3.2    /  TBili  0.3    /  DBili  x      /  AST  8      /  ALT  11     /  AlkPhos  108    08 Dec 2018 15:05          Blood Culture:   12-08 @ 15:05  Pro Bnp 2010 12-09 @ 09:51  TSH: 3.41        EKG: < from: 12 Lead ECG (12.10.18 @ 12:53) >  Diagnosis Line Sinus tachycardia with 1st degree AV block  Low voltage QRS  Septal infarct (cited on or before 08-DEC-2018)    < end of copied text >
Chief Complaint:  Patient is a 83y old  Female who presents with a chief complaint of rectal  bleeding   · Unable to Obtain: Dementia	  · HPI Objective Statement: 83 female presents to ER with daughters and home aide with report of dark blood in the stool. Patient had BM today and aide noted the blood in the toilet. Patient has dementia, poor historian, history obtained from the daughter.	  here unsure if she ever had a colonosocpy done in the past    Allergies:  No Known Allergies      Medications:  ALPRAZolam 0.25 milliGRAM(s) Oral three times a day  dextrose 40% Gel 15 Gram(s) Oral once PRN  dextrose 5%. 1000 milliLiter(s) IV Continuous <Continuous>  dextrose 50% Injectable 12.5 Gram(s) IV Push once  dextrose 50% Injectable 25 Gram(s) IV Push once  dextrose 50% Injectable 25 Gram(s) IV Push once  folic acid 1 milliGRAM(s) Oral daily  glucagon  Injectable 1 milliGRAM(s) IntraMuscular once PRN  insulin lispro (HumaLOG) corrective regimen sliding scale   SubCutaneous three times a day before meals  QUEtiapine 200 milliGRAM(s) Oral at bedtime  QUEtiapine 50 milliGRAM(s) Oral two times a day  risperiDONE   Tablet 0.25 milliGRAM(s) Oral two times a day  tobramycin 0.3% Ophthalmic Solution 1 Drop(s) Both EYES four times a day      PMHX/PSHX:  Hypothyroidism  Hypertension  Diabetes mellitus  S/P knee replacement, bilateral      Family history:  No pertinent family history in first degree relatives      Social History: no etoh nop cigs no ivda lives with aide    ROS:     General:  No wt loss, fevers, chills, night sweats, fatigue,   Eyes:  Good vision, no reported pain  ENT:  No sore throat, pain, runny nose, dysphagia  CV:  No pain, palpitations, hypo/hypertension  Resp:  No dyspnea, cough, tachypnea, wheezing  GI:  No pain, No nausea, No vomiting, No diarrhea, No constipation, No weight loss, No fever, No pruritis, No rectal bleeding, No tarry stools, No dysphagia,  :  No pain, bleeding, incontinence, nocturia  Muscle:  No pain, weakness  Neuro:  No weakness, tingling, memory problems  Psych:  No fatigue, insomnia, mood problems, depression  Endocrine:  No polyuria, polydipsia, cold/heat intolerance  Heme:  No petechiae, ecchymosis, easy bruisability  Skin:  No rash, tattoos, scars, edema      PHYSICAL EXAM:   Vital Signs:  Vital Signs Last 24 Hrs  T(C): 36.7 (10 Dec 2018 05:48), Max: 37.1 (09 Dec 2018 14:01)  T(F): 98 (10 Dec 2018 05:48), Max: 98.7 (09 Dec 2018 14:01)  HR: 84 (10 Dec 2018 05:48) (84 - 99)  BP: 94/62 (10 Dec 2018 05:48) (87/60 - 106/69)  BP(mean): --  RR: 16 (10 Dec 2018 05:48) (16 - 17)  SpO2: 95% (10 Dec 2018 05:48) (90% - 95%)  Daily     Daily     GENERAL:  Appears stated age, well-groomed, well-nourished, no distress  HEENT:  NC/AT,  conjunctivae clear and pink, no thyromegaly, nodules, adenopathy, no JVD, sclera -anicteric  CHEST:  Full & symmetric excursion, no increased effort, breath sounds clear  HEART:  Regular rhythm, S1, S2, no murmur/rub/S3/S4, no abdominal bruit, no edema  ABDOMEN:  Soft, non-tender, non-distended, normoactive bowel sounds,  no masses ,no hepato-splenomegaly, no signs of chronic liver disease  EXTEREMITIES:  no cyanosis,clubbing or edema  SKIN:  No rash/erythema/ecchymoses/petechiae/wounds/abscess/warm/dry  NEURO:  Alert, oriented, no asterixis, no tremor, no encephalopathy    LABS:                        10.2   8.67  )-----------( 134      ( 10 Dec 2018 08:28 )             32.1     12-09    142  |  109<H>  |  45<H>  ----------------------------<  98  4.5   |  23  |  2.20<H>    Ca    8.2<L>      09 Dec 2018 09:51    TPro  8.1  /  Alb  3.2<L>  /  TBili  0.3  /  DBili  x   /  AST  8<L>  /  ALT  11<L>  /  AlkPhos  108  12-08    LIVER FUNCTIONS - ( 08 Dec 2018 15:05 )  Alb: 3.2 g/dL / Pro: 8.1 g/dL / ALK PHOS: 108 U/L / ALT: 11 U/L / AST: 8 U/L / GGT: x           PT/INR - ( 08 Dec 2018 15:05 )   PT: 12.8 sec;   INR: 1.12 ratio                 Imaging:

## 2018-12-11 ENCOUNTER — TRANSCRIPTION ENCOUNTER (OUTPATIENT)
Age: 83
End: 2018-12-11

## 2018-12-11 LAB
ANION GAP SERPL CALC-SCNC: 6 MMOL/L — SIGNIFICANT CHANGE UP (ref 5–17)
BASOPHILS # BLD AUTO: 0.03 K/UL — SIGNIFICANT CHANGE UP (ref 0–0.2)
BASOPHILS NFR BLD AUTO: 0.3 % — SIGNIFICANT CHANGE UP (ref 0–2)
BUN SERPL-MCNC: 58 MG/DL — HIGH (ref 7–23)
CALCIUM SERPL-MCNC: 8.7 MG/DL — SIGNIFICANT CHANGE UP (ref 8.5–10.1)
CHLORIDE SERPL-SCNC: 108 MMOL/L — SIGNIFICANT CHANGE UP (ref 96–108)
CO2 SERPL-SCNC: 23 MMOL/L — SIGNIFICANT CHANGE UP (ref 22–31)
CREAT SERPL-MCNC: 2.2 MG/DL — HIGH (ref 0.5–1.3)
EOSINOPHIL # BLD AUTO: 0.17 K/UL — SIGNIFICANT CHANGE UP (ref 0–0.5)
EOSINOPHIL NFR BLD AUTO: 1.9 % — SIGNIFICANT CHANGE UP (ref 0–6)
GLUCOSE SERPL-MCNC: 104 MG/DL — HIGH (ref 70–99)
HCT VFR BLD CALC: 34.2 % — LOW (ref 34.5–45)
HGB BLD-MCNC: 11 G/DL — LOW (ref 11.5–15.5)
IMM GRANULOCYTES NFR BLD AUTO: 0.4 % — SIGNIFICANT CHANGE UP (ref 0–1.5)
LYMPHOCYTES # BLD AUTO: 1.69 K/UL — SIGNIFICANT CHANGE UP (ref 1–3.3)
LYMPHOCYTES # BLD AUTO: 18.7 % — SIGNIFICANT CHANGE UP (ref 13–44)
MCHC RBC-ENTMCNC: 32.2 GM/DL — SIGNIFICANT CHANGE UP (ref 32–36)
MCHC RBC-ENTMCNC: 33.2 PG — SIGNIFICANT CHANGE UP (ref 27–34)
MCV RBC AUTO: 103.3 FL — HIGH (ref 80–100)
MONOCYTES # BLD AUTO: 0.68 K/UL — SIGNIFICANT CHANGE UP (ref 0–0.9)
MONOCYTES NFR BLD AUTO: 7.5 % — SIGNIFICANT CHANGE UP (ref 2–14)
NEUTROPHILS # BLD AUTO: 6.42 K/UL — SIGNIFICANT CHANGE UP (ref 1.8–7.4)
NEUTROPHILS NFR BLD AUTO: 71.2 % — SIGNIFICANT CHANGE UP (ref 43–77)
NRBC # BLD: 0 /100 WBCS — SIGNIFICANT CHANGE UP (ref 0–0)
PLATELET # BLD AUTO: 140 K/UL — LOW (ref 150–400)
POTASSIUM SERPL-MCNC: 4.9 MMOL/L — SIGNIFICANT CHANGE UP (ref 3.5–5.3)
POTASSIUM SERPL-SCNC: 4.9 MMOL/L — SIGNIFICANT CHANGE UP (ref 3.5–5.3)
RBC # BLD: 3.31 M/UL — LOW (ref 3.8–5.2)
RBC # FLD: 14 % — SIGNIFICANT CHANGE UP (ref 10.3–14.5)
SODIUM SERPL-SCNC: 137 MMOL/L — SIGNIFICANT CHANGE UP (ref 135–145)
WBC # BLD: 9.03 K/UL — SIGNIFICANT CHANGE UP (ref 3.8–10.5)
WBC # FLD AUTO: 9.03 K/UL — SIGNIFICANT CHANGE UP (ref 3.8–10.5)

## 2018-12-11 PROCEDURE — 99233 SBSQ HOSP IP/OBS HIGH 50: CPT

## 2018-12-11 PROCEDURE — 93010 ELECTROCARDIOGRAM REPORT: CPT

## 2018-12-11 RX ORDER — METOPROLOL TARTRATE 50 MG
5 TABLET ORAL EVERY 6 HOURS
Qty: 0 | Refills: 0 | Status: DISCONTINUED | OUTPATIENT
Start: 2018-12-11 | End: 2018-12-13

## 2018-12-11 RX ORDER — METOPROLOL TARTRATE 50 MG
25 TABLET ORAL EVERY 12 HOURS
Qty: 0 | Refills: 0 | Status: DISCONTINUED | OUTPATIENT
Start: 2018-12-11 | End: 2018-12-11

## 2018-12-11 RX ADMIN — Medication 5 MILLIGRAM(S): at 14:32

## 2018-12-11 RX ADMIN — Medication 0.25 MILLIGRAM(S): at 05:34

## 2018-12-11 RX ADMIN — Medication 1 LITER(S): at 17:29

## 2018-12-11 RX ADMIN — Medication 1 DROP(S): at 05:34

## 2018-12-11 RX ADMIN — Medication 1 LITER(S): at 21:39

## 2018-12-11 RX ADMIN — QUETIAPINE FUMARATE 50 MILLIGRAM(S): 200 TABLET, FILM COATED ORAL at 11:36

## 2018-12-11 RX ADMIN — QUETIAPINE FUMARATE 50 MILLIGRAM(S): 200 TABLET, FILM COATED ORAL at 07:50

## 2018-12-11 RX ADMIN — Medication 5 MILLIGRAM(S): at 09:07

## 2018-12-11 RX ADMIN — Medication 0.25 MILLIGRAM(S): at 14:32

## 2018-12-11 RX ADMIN — Medication 10 MILLIGRAM(S): at 17:21

## 2018-12-11 RX ADMIN — Medication 3 MILLIGRAM(S): at 22:09

## 2018-12-11 RX ADMIN — RISPERIDONE 0.25 MILLIGRAM(S): 4 TABLET ORAL at 05:34

## 2018-12-11 RX ADMIN — RISPERIDONE 0.25 MILLIGRAM(S): 4 TABLET ORAL at 17:21

## 2018-12-11 RX ADMIN — QUETIAPINE FUMARATE 100 MILLIGRAM(S): 200 TABLET, FILM COATED ORAL at 21:50

## 2018-12-11 RX ADMIN — Medication 10 MILLIGRAM(S): at 21:50

## 2018-12-11 RX ADMIN — Medication 1 DROP(S): at 11:36

## 2018-12-11 RX ADMIN — PANTOPRAZOLE SODIUM 40 MILLIGRAM(S): 20 TABLET, DELAYED RELEASE ORAL at 11:35

## 2018-12-11 RX ADMIN — Medication 1 MILLIGRAM(S): at 11:36

## 2018-12-11 RX ADMIN — Medication 1 DROP(S): at 17:20

## 2018-12-11 RX ADMIN — Medication 1: at 12:24

## 2018-12-11 RX ADMIN — Medication 0.25 MILLIGRAM(S): at 21:50

## 2018-12-11 RX ADMIN — Medication 1 DROP(S): at 01:55

## 2018-12-11 RX ADMIN — Medication 5 MILLIGRAM(S): at 22:07

## 2018-12-11 NOTE — PROGRESS NOTE ADULT - SUBJECTIVE AND OBJECTIVE BOX
[facility] -- Preeti Ortiz, Eugenia Newby, Medardo Coats Savella  Office # [phone]    Follow Up:  Tachycardia/Preop Eval    Subjective/Observations: Awake and alert, denies CP or palpitations.  Denies SOB or JONES.  Denies dizziness, lightheadedness, or bloody stools.    REVIEW OF SYSTEMS: All other review of systems is negative unless indicated above    PAST MEDICAL & SURGICAL HISTORY:  Hypothyroidism  Hypertension  Diabetes mellitus  S/P knee replacement, bilateral    MEDICATIONS  (STANDING):  ALPRAZolam 0.25 milliGRAM(s) Oral three times a day  bisacodyl 10 milliGRAM(s) Oral every 4 hours  dextrose 5%. 1000 milliLiter(s) (50 mL/Hr) IV Continuous <Continuous>  dextrose 50% Injectable 12.5 Gram(s) IV Push once  dextrose 50% Injectable 25 Gram(s) IV Push once  dextrose 50% Injectable 25 Gram(s) IV Push once  folic acid 1 milliGRAM(s) Oral daily  insulin lispro (HumaLOG) corrective regimen sliding scale   SubCutaneous three times a day before meals  melatonin 3 milliGRAM(s) Oral at bedtime  pantoprazole  Injectable 40 milliGRAM(s) IV Push daily  polyethylene glycol/electrolyte Solution 1 Liter(s) Oral every 4 hours  QUEtiapine 50 milliGRAM(s) Oral two times a day  QUEtiapine 100 milliGRAM(s) Oral at bedtime  risperiDONE   Tablet 0.25 milliGRAM(s) Oral two times a day  sodium chloride 0.9%. 1000 milliLiter(s) (50 mL/Hr) IV Continuous <Continuous>  tobramycin 0.3% Ophthalmic Solution 1 Drop(s) Both EYES four times a day    MEDICATIONS  (PRN):  dextrose 40% Gel 15 Gram(s) Oral once PRN Blood Glucose LESS THAN 70 milliGRAM(s)/deciliter  glucagon  Injectable 1 milliGRAM(s) IntraMuscular once PRN Glucose LESS THAN 70 milligrams/deciliter    Allergies    No Known Allergies    Intolerances    Vital Signs Last 24 Hrs  T(C): 36.7 (11 Dec 2018 04:51), Max: 36.7 (11 Dec 2018 04:05)  T(F): 98 (11 Dec 2018 04:51), Max: 98 (11 Dec 2018 04:05)  HR: 96 (11 Dec 2018 04:51) (82 - 127)  BP: 122/74 (11 Dec 2018 04:51) (109/74 - 136/72)  BP(mean): --  RR: 17 (11 Dec 2018 04:51) (17 - 18)  SpO2: 96% (11 Dec 2018 04:51) (89% - 98%)    I&O's Summary    10 Dec 2018 07:01  -  11 Dec 2018 07:00  --------------------------------------------------------  IN: 980 mL / OUT: 0 mL / NET: 980 mL    PHYSICAL EXAM:  TELE: Sinus rhythm/sinus tach, Aflutter?  Constitutional: NAD, awake and alert, obese  HEENT: Moist Mucous Membranes, Anicteric  Pulmonary: Non-labored, breath sounds are clear bilaterally, No wheezing, rales or rhonchi  Cardiovascular: Regular, S1 and S2, No murmurs, rubs, gallops or clicks  Gastrointestinal: Bowel Sounds present, soft, nontender.   Lymph: No peripheral edema. No lymphadenopathy.  Skin: No visible rashes or ulcers.  Psych:  Mood & affect appropriate    LABS: All Labs Reviewed:                        10.2   8.67  )-----------( 134      ( 10 Dec 2018 08:28 )             32.1                         10.3   7.08  )-----------( 140      ( 09 Dec 2018 09:51 )             32.6                         10.8   9.33  )-----------( 173      ( 08 Dec 2018 15:05 )             34.0     09 Dec 2018 09:51    142    |  109    |  45     ----------------------------<  98     4.5     |  23     |  2.20   08 Dec 2018 15:05    141    |  109    |  42     ----------------------------<  138    4.8     |  24     |  2.00     Ca    8.2        09 Dec 2018 09:51  Ca    8.2        08 Dec 2018 15:05    TPro  8.1    /  Alb  3.2    /  TBili  0.3    /  DBili  x      /  AST  8      /  ALT  11     /  AlkPhos  108    08 Dec 2018 15:05    < from: Xray Chest 1 View- PORTABLE-Urgent (12.08.18 @ 14:42) >    EXAM:  XR CHEST PORTABLE URGENT 1V                          PROCEDURE DATE:  12/08/2018      INTERPRETATION:  Clinical History: Rectal bleed.    Comparison: None.      Findings:  The heart and mediastinal contours are normal. No segmental   infiltrates or effusions are observed. No pneumothorax is noted.       IMPRESSION:   1. Negative Chest.    DALLAS LEMONS M.D., ATTENDING RADIOLOGIST  This document has been electronically signed. Dec  8 2018  3:03PM      < end of copied text >

## 2018-12-11 NOTE — CHART NOTE - NSCHARTNOTEFT_GEN_A_CORE
Called by RN because patient refusing bowel prep. Patient is scheduled for EGD/colonoscopy tomorrow for GIB. Patient seen at bedside, sleeping. I explained importance of bowel prep and procedure. Approximately 3/4 of Moviprep remained in bottle. Patient stated she already drank whole bottle. Became angry, shouted and refused to drink more.

## 2018-12-11 NOTE — CHART NOTE - NSCHARTNOTEFT_GEN_A_CORE
Called by RN because patient tachycardic to 130s on tele monitor. 84 yo f pmx htn, dm, hypothyroidism, ckd, varicoses vein, dementia presents for rectal bleeding. Patient asymptomatic. RN took new vitals, revealed O2 sat 89 on RA, , temp and BP WNL    Physical Exam:  General: No Acute Distress  HEENT: Normocephallic Atraumatic  Neurology: nonfocal  Respiratory: Clear To Auscultation B/L, No Wheezes, rhonchi or rales  CV: Regular tachycardic, +S1/S2, no murmurs, rubs or gallops    A/P 84 yo female PMH HTN, DM. hypothyroid, CKD, dementia here for rectal bleeding, now tachycardic with low O2 sat  Patient placed on NC, O2 sat justo to 96 percent  HR in 120s  asymptomatic  patient being followed by cardiology, Dr. Coats  H/H stable at 10.3 yesterday    Mg and phos ordered for AM  continue NC  follow up AM H/H  continue gentle hydration  will follow

## 2018-12-11 NOTE — PROGRESS NOTE ADULT - SUBJECTIVE AND OBJECTIVE BOX
PCP  Subjective:   in bed , awake , anxious , hyper , but aware of her plans for colonoscopy ,    HR sinus tachy ,     Objective:   Vital Signs Last 24 Hrs  T(C): 36.7 (12-11-18 @ 04:51), Max: 36.7 (12-11-18 @ 04:05)  T(F): 98 (12-11-18 @ 04:51), Max: 98 (12-11-18 @ 04:05)  HR: 96 (12-11-18 @ 04:51) (82 - 127)  BP: 122/74 (12-11-18 @ 04:51) (109/74 - 136/72)  BP(mean): --  RR: 17 (12-11-18 @ 04:51) (17 - 18)  SpO2: 96% (12-11-18 @ 04:51) (89% - 98%)  Daily     Daily     GENERAL:  wdwn obese female , awake , alert responsive , but confused , mild sob   EYES: eomi michele  NECK: supple no mass  CHEST/LUNG: clear , but poor air movement  HEART: s1 s2 tachycardic   ABDOMEN:  soft nt obese , + bs, obese ,   EXTREMITIES: no edema   SKIN:  warm ,   CNS:  awake , alert , non focal , confused , weak , immobility issues due to morbid obesity  MSK:  able to move and ambulate with walker ,       Allergies: Allergies    No Known Allergies    Intolerances        Home Medications:  allopurinol 100 mg oral tablet: 1 tab(s) orally once a day (08 Dec 2018 19:32)  ALPRAZolam 0.25 mg oral tablet: 1 tab(s) orally once a day (08 Dec 2018 19:32)  citalopram 10 mg oral tablet: 1 tab(s) orally once a day (08 Dec 2018 19:32)  clopidogrel 75 mg oral tablet: 1 tab(s) orally once a day (08 Dec 2018 19:32)  ferrous sulfate (as elemental iron) 45 mg oral tablet, extended release: 1 tab(s) orally once a day (08 Dec 2018 19:32)  folic acid 1 mg oral tablet: 1 tab(s) orally once a day (08 Dec 2018 19:32)  furosemide 20 mg oral tablet: 1 tab(s) orally once a day (08 Dec 2018 19:32)  Klor-Con 20 mEq oral powder for reconstitution:  orally once a day (08 Dec 2018 19:32)  levothyroxine 100 mcg (0.1 mg) oral tablet: 1 tab(s) orally once a day (08 Dec 2018 19:32)  lisinopril 10 mg oral tablet: 1 tab(s) orally once a day (08 Dec 2018 19:32)  Namzaric 28 mg-10 mg oral capsule, extended release: 1 cap(s) orally once a day (08 Dec 2018 19:32)  QUEtiapine 200 mg oral tablet: 1 tab(s) orally once a day (at bedtime) (08 Dec 2018 19:32)  QUEtiapine 50 mg oral tablet: 1 tab(s) orally 2 times a day with breakfast and lunch (08 Dec 2018 19:32)  simvastatin 40 mg oral tablet: 1 tab(s) orally once a day (at bedtime) (08 Dec 2018 19:32)  tobramycin ophthalmic 0.3% ophthalmic solution: 1 drop(s) to each affected eye 4 times a day (08 Dec 2018 19:32)  Trulicity Pen 0.75 mg/0.5 mL subcutaneous solution: subcutaneous once a week (08 Dec 2018 19:32)  Vitamin B-100 oral tablet: 1 tab(s) orally once a day (08 Dec 2018 19:32)    Medications:   ALPRAZolam 0.25 milliGRAM(s) Oral three times a day  bisacodyl 10 milliGRAM(s) Oral every 4 hours  dextrose 40% Gel 15 Gram(s) Oral once PRN  dextrose 5%. 1000 milliLiter(s) IV Continuous <Continuous>  dextrose 50% Injectable 12.5 Gram(s) IV Push once  dextrose 50% Injectable 25 Gram(s) IV Push once  dextrose 50% Injectable 25 Gram(s) IV Push once  folic acid 1 milliGRAM(s) Oral daily  glucagon  Injectable 1 milliGRAM(s) IntraMuscular once PRN  insulin lispro (HumaLOG) corrective regimen sliding scale   SubCutaneous three times a day before meals  melatonin 3 milliGRAM(s) Oral at bedtime  metoprolol tartrate 25 milliGRAM(s) Oral every 12 hours  metoprolol tartrate Injectable 5 milliGRAM(s) IV Push every 6 hours  pantoprazole  Injectable 40 milliGRAM(s) IV Push daily  polyethylene glycol/electrolyte Solution 1 Liter(s) Oral every 4 hours  QUEtiapine 50 milliGRAM(s) Oral two times a day  QUEtiapine 100 milliGRAM(s) Oral at bedtime  risperiDONE   Tablet 0.25 milliGRAM(s) Oral two times a day  sodium chloride 0.9%. 1000 milliLiter(s) IV Continuous <Continuous>  tobramycin 0.3% Ophthalmic Solution 1 Drop(s) Both EYES four times a day      LABS:                        11.0   9.03  )-----------( 140      ( 11 Dec 2018 08:29 )             34.2     12-11    137  |  108  |  58<H>  ----------------------------<  104<H>  4.9   |  23  |  2.20<H>    Ca    8.7      11 Dec 2018 08:29        12-08 @ 15:05  INR 1.12        CAPILLARY BLOOD GLUCOSE      POCT Blood Glucose.: 123 mg/dL (11 Dec 2018 08:03)  POCT Blood Glucose.: 111 mg/dL (10 Dec 2018 22:26)  POCT Blood Glucose.: 119 mg/dL (10 Dec 2018 16:40)  POCT Blood Glucose.: 119 mg/dL (10 Dec 2018 11:36)          RECENT CULTURES:

## 2018-12-11 NOTE — PROGRESS NOTE ADULT - ASSESSMENT
· Chief Complaint: The patient is a 83y Female complaining of rectal bleeding.	  · Unable to Obtain: Dementia	  · HPI Objective Statement: 83 female presents to ER with daughters and home aide with report of dark blood in the stool. Patient had BM today and aide noted the blood in the toilet. Patient has dementia, poor historian, history obtained from the daughter.	    PAST MEDICAL/SURGICAL/FAMILY/SOCIAL HISTORY:    Past Medical History:  Diabetes mellitus    Hypertension    Hypothyroidism.  obesity  Phlebitis   varicose veins     79 yo f,  , lives with 24/7 HHA and dtr helps out , has hx of obesity , diabetes mellitus type 2 , ckd stage 4,  bipolar , and anxiety , and varicose veins , who presents with rectal bleed , no abd pain , no nausea no vomiting ,   patient is dnr and dni per mols and ACP reviewed with dtr ,   admit and monitor for gi bleed and work up  on 12/11/18 patient ate BF , and feels ok over all , denies cp , mild sob , patient is tachycardic and order written for lopressor 5 mg , and nurse said that residents were not available to administer and I administered it slow iv push , checked with Telemetry and tachycardia broke at 9:12 am immediately after iv push lopressor and in NSR at about 80 bpm ,  plan colonoscopy , dtr aware , may need rehab , cardiology to f/u for HR issues ,

## 2018-12-11 NOTE — PROGRESS NOTE ADULT - PROBLEM SELECTOR PLAN 1
fobt +  no s/s overt gib overnight  trend h/h, transfuse prn  hold ac asa nsaids  ppi once a day  if w active gib check cta/bleeding scan  plan for egd/colon tomorrow, clears today, prep as ordered, npo p mn, will need medical and cardiac clearance, d/w daughter/pt

## 2018-12-11 NOTE — DIETITIAN INITIAL EVALUATION ADULT. - OTHER INFO
unable to obtain history from this patient with history dementia. family not at bedside. patient with 24/7 aide from home . for colonoscopy and EGD 12/12 ,patient with class 3 obesity not a candidate for education at this time will await family for review of weight reduction tips.

## 2018-12-11 NOTE — DIETITIAN INITIAL EVALUATION ADULT. - SIGNS/SYMPTOMS
as evidenced by rectal bleed, patient on clear liquids as evidenced by BMI obesity grade 3 (47.1) and estimated excessive energy intake

## 2018-12-11 NOTE — DIETITIAN INITIAL EVALUATION ADULT. - NS AS NUTRI INTERV MEALS SNACK
after procedure advance diet as appropriate/Mineral - modified diet/Other (specify)/Fat - modified diet/Carbohydrate - modified diet

## 2018-12-11 NOTE — PROGRESS NOTE ADULT - ASSESSMENT
82 yo f pmx htn, dm, hypothyroidism, ckd, varicoses vein, dementia admitted for rectal bleeding.  Called for 2 episodes of sustained tachycardia into 130/140s early last night and this morning.  Have now resolved, HR now steady in 95s.    - With episodes of tachycardia overnight up to 120's.  Unable to clearly determine rhythm from tele strips, however, I am leaning towards sinus tach with 1st degree AVB with the p wave coming right after the T wave but could be 1:1 Aflutter.  Please obtain 12 lead EKG while patient is tachycardic to better elucidate rhythm.  If in fact, patient is in Aflutter or Afib, having [id number] score of 5, she will need AC for thromboembolic prevention after her GI issue is resolved and when she is cleared by GI.  For now, may start low dose BB for rate control  - Continue to monitor on tele  - Unsure why she is on Plavix, but may continue to hold Plavix in setting of bleed.  GI following, may need EGD/colonoscopy  - No clear evidence of acute ischemia or volume overload, continue statin  - Non pitting edema on left ankle/foot, has had in the past, on lasix at home, continue to hold for now in setting of dehydration  - No acute changes on EKG compared to previous  - No previous echo done, never seen cardiologist outpt, no other significant cardiac hx  - Agree to hold ACEI for episodes of hypotension.  May reintroduce when more stable  - Monitor and replete lytes, keep K>4, Mg>2.  - Pt has no active ischemia, decompensated HF, unstable arrythmia, or obstructing valvular disease and in the setting of low risk procedure, patient is optimized from cardiovascular standpoint to proceed with planned colonoscopy with routine hemodynamic monitoring.   - Other cardiovascular workup will depend on clinical course.  - All other workup per primary team.  - Will follow.           Florence Martinez NP  Cardiology 82 yo f pmx htn, dm, hypothyroidism, ckd, varicoses vein, dementia admitted for rectal bleeding.  Called for 2 episodes of sustained tachycardia into 130/140s early last night and this morning.  Have now resolved, HR now steady in 95s.    - With episodes of tachycardia overnight up to 120's.  Unable to clearly determine rhythm from tele strips.  However, I am leaning away from sinus tach and more toward atrial flutter with 2:1 conduction, or sustained reentrant SVT.  Please obtain 12 lead EKG while patient is tachycardic to better elucidate rhythm.  If in fact, patient is in Aflutter or Afib, having [id number] score of 5, she will need AC for thromboembolic prevention after her GI issue is resolved and when she is cleared by GI.  For now, will start low dose BB for rate control  - Continue to monitor on tele  - Unsure why she is on Plavix, but may continue to hold Plavix in setting of bleed.  GI following, planned for EGD/colonoscopy tomorrow.  Will need to evaluate her response to metoprolol and observe her heart rhythm over the next 24h, and will comment tomorrow on her candidacy for the procedure.  - No clear evidence of acute ischemia or volume overload, continue statin  - Non pitting edema on left ankle/foot, has had in the past, on lasix at home, continue to hold for now in setting of dehydration  - No acute changes on EKG compared to previous  - No previous echo done, never seen cardiologist outpt, no other significant cardiac hx  - Agree to hold ACEI for episodes of hypotension.  May reintroduce when more stable  - Monitor and replete lytes, keep K>4, Mg>2.  - Other cardiovascular workup will depend on clinical course.  - All other workup per primary team.  - Will follow.           Florence Martinez NP  Cardiology

## 2018-12-11 NOTE — PROGRESS NOTE ADULT - SUBJECTIVE AND OBJECTIVE BOX
INTERVAL HPI/OVERNIGHT EVENTS:  pt seen and examined  confused but denies abd pain  afebrile overnight labs noted  per overnight rn no s/s active gib    MEDICATIONS  (STANDING):  ALPRAZolam 0.25 milliGRAM(s) Oral three times a day  bisacodyl 10 milliGRAM(s) Oral every 4 hours  dextrose 5%. 1000 milliLiter(s) (50 mL/Hr) IV Continuous <Continuous>  dextrose 50% Injectable 12.5 Gram(s) IV Push once  dextrose 50% Injectable 25 Gram(s) IV Push once  dextrose 50% Injectable 25 Gram(s) IV Push once  folic acid 1 milliGRAM(s) Oral daily  insulin lispro (HumaLOG) corrective regimen sliding scale   SubCutaneous three times a day before meals  melatonin 3 milliGRAM(s) Oral at bedtime  metoprolol tartrate 25 milliGRAM(s) Oral every 12 hours  pantoprazole  Injectable 40 milliGRAM(s) IV Push daily  polyethylene glycol/electrolyte Solution 1 Liter(s) Oral every 4 hours  QUEtiapine 50 milliGRAM(s) Oral two times a day  QUEtiapine 100 milliGRAM(s) Oral at bedtime  risperiDONE   Tablet 0.25 milliGRAM(s) Oral two times a day  sodium chloride 0.9%. 1000 milliLiter(s) (50 mL/Hr) IV Continuous <Continuous>  tobramycin 0.3% Ophthalmic Solution 1 Drop(s) Both EYES four times a day    MEDICATIONS  (PRN):  dextrose 40% Gel 15 Gram(s) Oral once PRN Blood Glucose LESS THAN 70 milliGRAM(s)/deciliter  glucagon  Injectable 1 milliGRAM(s) IntraMuscular once PRN Glucose LESS THAN 70 milligrams/deciliter      Allergies    No Known Allergies    Intolerances        Review of Systems:  unable to obtain in entirety      Vital Signs Last 24 Hrs  T(C): 36.7 (11 Dec 2018 04:51), Max: 36.7 (11 Dec 2018 04:05)  T(F): 98 (11 Dec 2018 04:51), Max: 98 (11 Dec 2018 04:05)  HR: 96 (11 Dec 2018 04:51) (82 - 127)  BP: 122/74 (11 Dec 2018 04:51) (109/74 - 136/72)  BP(mean): --  RR: 17 (11 Dec 2018 04:51) (17 - 18)  SpO2: 96% (11 Dec 2018 04:51) (89% - 98%)    PHYSICAL EXAM:    Constitutional: NAD lying in bed  HEENT: ncat  Neck: No LAD  Respiratory: dec bs  Cardiovascular: S1 and S2, RRR  Gastrointestinal: obese soft nt nd  Extremities: edema  Vascular: 2+ peripheral pulses  Neurological: Awake alert  Skin: No rashes      LABS:                        11.0   9.03  )-----------( x        ( 11 Dec 2018 08:29 )             34.2     12-09    142  |  109<H>  |  45<H>  ----------------------------<  98  4.5   |  23  |  2.20<H>    Ca    8.2<L>      09 Dec 2018 09:51            RADIOLOGY & ADDITIONAL TESTS:

## 2018-12-12 ENCOUNTER — RESULT REVIEW (OUTPATIENT)
Age: 83
End: 2018-12-12

## 2018-12-12 ENCOUNTER — TRANSCRIPTION ENCOUNTER (OUTPATIENT)
Age: 83
End: 2018-12-12

## 2018-12-12 LAB
MAGNESIUM SERPL-MCNC: 2 MG/DL — SIGNIFICANT CHANGE UP (ref 1.6–2.6)
PHOSPHATE SERPL-MCNC: 4.3 MG/DL — SIGNIFICANT CHANGE UP (ref 2.5–4.5)

## 2018-12-12 PROCEDURE — 88305 TISSUE EXAM BY PATHOLOGIST: CPT | Mod: 26

## 2018-12-12 PROCEDURE — 99232 SBSQ HOSP IP/OBS MODERATE 35: CPT

## 2018-12-12 PROCEDURE — 88312 SPECIAL STAINS GROUP 1: CPT | Mod: 26

## 2018-12-12 RX ORDER — ALPRAZOLAM 0.25 MG
1 TABLET ORAL
Qty: 0 | Refills: 0 | COMMUNITY

## 2018-12-12 RX ORDER — PANTOPRAZOLE SODIUM 20 MG/1
1 TABLET, DELAYED RELEASE ORAL
Qty: 0 | Refills: 0 | COMMUNITY
Start: 2018-12-12

## 2018-12-12 RX ORDER — LACTULOSE 10 G/15ML
30 SOLUTION ORAL ONCE
Qty: 0 | Refills: 0 | Status: COMPLETED | OUTPATIENT
Start: 2018-12-12 | End: 2018-12-12

## 2018-12-12 RX ORDER — METOPROLOL TARTRATE 50 MG
1 TABLET ORAL
Qty: 0 | Refills: 0 | COMMUNITY

## 2018-12-12 RX ORDER — RISPERIDONE 4 MG/1
1 TABLET ORAL
Qty: 0 | Refills: 0 | COMMUNITY
Start: 2018-12-12

## 2018-12-12 RX ORDER — LANOLIN ALCOHOL/MO/W.PET/CERES
1 CREAM (GRAM) TOPICAL
Qty: 0 | Refills: 0 | COMMUNITY
Start: 2018-12-12

## 2018-12-12 RX ADMIN — Medication 5 MILLIGRAM(S): at 21:46

## 2018-12-12 RX ADMIN — RISPERIDONE 0.25 MILLIGRAM(S): 4 TABLET ORAL at 17:36

## 2018-12-12 RX ADMIN — QUETIAPINE FUMARATE 100 MILLIGRAM(S): 200 TABLET, FILM COATED ORAL at 21:42

## 2018-12-12 RX ADMIN — RISPERIDONE 0.25 MILLIGRAM(S): 4 TABLET ORAL at 06:45

## 2018-12-12 RX ADMIN — Medication 1 DROP(S): at 17:36

## 2018-12-12 RX ADMIN — Medication 0.25 MILLIGRAM(S): at 21:42

## 2018-12-12 RX ADMIN — Medication 1 DROP(S): at 13:10

## 2018-12-12 RX ADMIN — LACTULOSE 30 GRAM(S): 10 SOLUTION ORAL at 10:02

## 2018-12-12 RX ADMIN — Medication 1 DROP(S): at 06:45

## 2018-12-12 RX ADMIN — Medication 5 MILLIGRAM(S): at 10:03

## 2018-12-12 RX ADMIN — Medication 3 MILLIGRAM(S): at 21:42

## 2018-12-12 RX ADMIN — Medication 10 MILLIGRAM(S): at 06:45

## 2018-12-12 RX ADMIN — PANTOPRAZOLE SODIUM 40 MILLIGRAM(S): 20 TABLET, DELAYED RELEASE ORAL at 13:09

## 2018-12-12 RX ADMIN — Medication 0.25 MILLIGRAM(S): at 06:45

## 2018-12-12 RX ADMIN — Medication 1 DROP(S): at 23:24

## 2018-12-12 NOTE — DISCHARGE NOTE ADULT - CARE PLAN
Principal Discharge DX:	Rectal bleeding  Goal:	prepped for colonoscopy  Assessment and plan of treatment:	take PPI  Secondary Diagnosis:	Bipolar affective disorder, currently manic, moderate  Goal:	seraquel  Assessment and plan of treatment:	emotional support  Secondary Diagnosis:	CKD (chronic kidney disease), stage IV  Goal:	monitor ,  Assessment and plan of treatment:	possibly related to diabetic nephropathy and chronic issues  Secondary Diagnosis:	Diabetic nephropathy associated with type 2 diabetes mellitus  Goal:	monitor  Secondary Diagnosis:	Hypothyroidism, unspecified type  Goal:	synthroid  Secondary Diagnosis:	Type 2 diabetes mellitus with other circulatory complication  Goal:	may need januvia for now on trulicity  Secondary Diagnosis:	Renovascular hypertension  Goal:	metoprolol and lisinopril

## 2018-12-12 NOTE — DISCHARGE NOTE ADULT - PLAN OF CARE
pre[facility] for colonoscopy [facility] , possibly related to diabetic nephropathy and chronic issues monitor synthroid may need januvia for now on trulicity metoprolol and lisinopril

## 2018-12-12 NOTE — DISCHARGE NOTE ADULT - HOSPITAL COURSE
Assessment and Plan:   · Assessment		  · Chief Complaint: The patient is a 83y Female complaining of rectal bleeding.	  · Unable to Obtain: Dementia	  · HPI Objective Statement: 83 female presents to ER with daughters and home aide with report of dark blood in the stool. Patient had BM today and aide noted the blood in the toilet. Patient has dementia, poor historian, history obtained from the daughter.	    PAST MEDICAL/SURGICAL/FAMILY/SOCIAL HISTORY:    Past Medical History:  Diabetes mellitus    Hypertension    Hypothyroidism.  obesity  Phlebitis   varicose veins     79 yo f,  , lives with 24/7 HHA and dtr helps out , has hx of obesity , diabetes mellitus type 2 , ckd stage 4,  bipolar , and anxiety , and varicose veins , who presents with rectal bleed , no abd pain , no nausea no vomiting ,   patient is dnr and dni per mols and ACP reviewed with dtr ,   admit and monitor for gi bleed and work up  on 12/11/18 patient ate BF , and feels ok over all , denies cp , mild sob , patient is tachycardic and order written for lopressor 5 mg , and nurse said that residents were not available to administer and I administered it slow iv push , checked with Telemetry and tachycardia broke at 9:12 am immediately after iv push lopressor and in NSR at about 80 bpm ,  plan colonoscopy , dtr aware , may need rehab , cardiology to f/u for HR issues ,   on 12/12/18 awaiting colonoscopy , patient may not have drank the prep completely , plan dc when stable hopefully by am with HHA         Problem/Plan - 1:  ·  Problem: Rectal bleeding.  Plan: follow up cbc , gi eval.      Problem/Plan - 2:  ·  Problem: Vascular dementia without behavioral disturbance.  Plan: monitor.      Problem/Plan - 3:  ·  Problem: Renovascular hypertension.  Plan: metoprolol.      Problem/Plan - 4:  ·  Problem: Type 2 diabetes mellitus with other circulatory complication.  Plan: fs and coverage hga1c stable.      Problem/Plan - 5:  ·  Problem: Diabetic nephropathy associated with type 2 diabetes mellitus.  Plan: chronic ckd stage 4.      Problem/Plan - 6:  Problem: CKD (chronic kidney disease), stage IV. Plan: dm related.     Problem/Plan - 7:  ·  Problem: Bipolar affective disorder, currently manic, moderate.  Plan: risperidone and Seroquel.      Problem/Plan - 8:  ·  Problem: Hypothyroidism, unspecified type.  Plan: synthroid.      Problem/Plan - 9:  ·  Problem: Osteoarthritis of multiple joints, unspecified osteoarthritis type.  Plan: tylenol. Assessment and Plan:   · Assessment		  · Chief Complaint: The patient is a 83y Female complaining of rectal bleeding.	  · Unable to Obtain: Dementia	  · HPI Objective Statement: 83 female presents to ER with daughters and home aide with report of dark blood in the stool. Patient had BM today and aide noted the blood in the toilet. Patient has dementia, poor historian, history obtained from the daughter.	    PAST MEDICAL/SURGICAL/FAMILY/SOCIAL HISTORY:    Past Medical History:  Diabetes mellitus    Hypertension    Hypothyroidism.  obesity  Phlebitis   varicose veins     81 yo f,  , lives with 24/7 HHA and dtr helps out , has hx of obesity , diabetes mellitus type 2 , ckd stage 4,  bipolar , and anxiety , and varicose veins , who presents with rectal bleed , no abd pain , no nausea no vomiting ,   patient is dnr and dni per mols and ACP reviewed with dtr ,   admit and monitor for gi bleed and work up  on 12/11/18 patient ate BF , and feels ok over all , denies cp , mild sob , patient is tachycardic and order written for lopressor 5 mg , and nurse said that residents were not available to administer and I administered it slow iv push , checked with Telemetry and tachycardia broke at 9:12 am immediately after iv push lopressor and in NSR at about 80 bpm ,  plan colonoscopy , dtr aware , may need rehab , cardiology to f/u for HR issues ,   on 12/12/18 awaiting colonoscopy , patient may not have drank the prep completely , plan dc when stable hopefully by am with HHA         Problem/Plan - 1:  ·  Problem: Rectal bleeding.  Plan: follow up cbc , gi eval.      Problem/Plan - 2:  ·  Problem: Vascular dementia without behavioral disturbance.  Plan: monitor.      Problem/Plan - 3:  ·  Problem: Renovascular hypertension.  Plan: metoprolol.      Problem/Plan - 4:  ·  Problem: Type 2 diabetes mellitus with other circulatory complication.  Plan: fs and coverage hga1c stable.      Problem/Plan - 5:  ·  Problem: Diabetic nephropathy associated with type 2 diabetes mellitus.  Plan: chronic ckd stage 4.      Problem/Plan - 6:  Problem: CKD (chronic kidney disease), stage IV. Plan: dm related.     Problem/Plan - 7:  ·  Problem: Bipolar affective disorder, currently manic, moderate.  Plan: risperidone and Seroquel.      Problem/Plan - 8:  ·  Problem: Hypothyroidism, unspecified type.  Plan: synthroid.      Problem/Plan - 9:  ·  Problem: Osteoarthritis of multiple joints, unspecified osteoarthritis type.  Plan: tylenol. Assessment and Plan:   · Assessment		  · Chief Complaint: The patient is a 83y Female complaining of rectal bleeding.	  · Unable to Obtain: Dementia	  · HPI Objective Statement: 83 female presents to ER with daughters and home aide with report of dark blood in the stool. Patient had BM today and aide noted the blood in the toilet. Patient has dementia, poor historian, history obtained from the daughter.	    PAST MEDICAL/SURGICAL/FAMILY/SOCIAL HISTORY:    Past Medical History:  Diabetes mellitus    Hypertension    Hypothyroidism.  obesity  Phlebitis   varicose veins     79 yo f,  , lives with 24/7 HHA and dtr helps out , has hx of obesity , diabetes mellitus type 2 , ckd stage 4,  bipolar , and anxiety , and varicose veins , who presents with rectal bleed , no abd pain , no nausea no vomiting ,   patient is dnr and dni per mols and ACP reviewed with dtr ,   admit and monitor for gi bleed and work up  on 12/11/18 patient ate BF , and feels ok over all , denies cp , mild sob , patient is tachycardic and order written for lopressor 5 mg , and nurse said that residents were not available to administer and I administered it slow iv push , checked with Telemetry and tachycardia broke at 9:12 am immediately after iv push lopressor and in NSR at about 80 bpm ,  plan colonoscopy , dtr aware , may need rehab , cardiology to f/u for HR issues ,   on 12/12/18 awaiting colonoscopy , patient may not have drank the prep completely , plan dc when stable hopefully by am with HHA  patient had colonoscopy and egd , all stable , some hemorrhoids ,          Problem/Plan - 1:  ·  Problem: Rectal bleeding.  Plan: follow up cbc , gi eval.      Problem/Plan - 2:  ·  Problem: Vascular dementia without behavioral disturbance.  Plan: monitor.      Problem/Plan - 3:  ·  Problem: Renovascular hypertension.  Plan: metoprolol.      Problem/Plan - 4:  ·  Problem: Type 2 diabetes mellitus with other circulatory complication.  Plan: fs and coverage hga1c stable.      Problem/Plan - 5:  ·  Problem: Diabetic nephropathy associated with type 2 diabetes mellitus.  Plan: chronic ckd stage 4.      Problem/Plan - 6:  Problem: CKD (chronic kidney disease), stage IV. Plan: dm related.     Problem/Plan - 7:  ·  Problem: Bipolar affective disorder, currently manic, moderate.  Plan: risperidone and Seroquel.      Problem/Plan - 8:  ·  Problem: Hypothyroidism, unspecified type.  Plan: synthroid.      Problem/Plan - 9:  ·  Problem: Osteoarthritis of multiple joints, unspecified osteoarthritis type.  Plan: tylenol.

## 2018-12-12 NOTE — DISCHARGE NOTE ADULT - MEDICATION SUMMARY - MEDICATIONS TO TAKE
I will START or STAY ON the medications listed below when I get home from the hospital:    lisinopril 10 mg oral tablet  -- 1 tab(s) by mouth once a day  -- Indication: For Htn    citalopram 10 mg oral tablet  -- 1 tab(s) by mouth once a day  -- Indication: For Depression    Trulicity Pen 0.75 mg/0.5 mL subcutaneous solution  -- subcutaneous once a week  -- Indication: For Dm    allopurinol 100 mg oral tablet  -- 1 tab(s) by mouth once a day  -- Indication: For gout    simvastatin 40 mg oral tablet  -- 1 tab(s) by mouth once a day (at bedtime)  -- Indication: For Hyperlipidemia    clopidogrel 75 mg oral tablet  -- 1 tab(s) by mouth once a day  -- Indication: For Varicouse veins    QUEtiapine 50 mg oral tablet  -- 1 tab(s) by mouth 2 times a day with breakfast and lunch  -- Indication: For anxiety    QUEtiapine 200 mg oral tablet  -- 1 tab(s) by mouth once a day (at bedtime)  -- Indication: For anxietyand bipolar    risperiDONE 0.25 mg oral tablet  -- 1 tab(s) by mouth 2 times a day  -- Indication: For Bipolar affective disorder, currently manic, moderate    metoprolol succinate 50 mg oral tablet, extended release  -- 1 tab(s) by mouth once a day  -- Indication: For Tachycardia    Namzaric 28 mg-10 mg oral capsule, extended release  -- 1 cap(s) by mouth once a day  -- Indication: For Dementia    furosemide 20 mg oral tablet  -- 1 tab(s) by mouth once a day  -- Indication: For edema    ferrous sulfate (as elemental iron) 45 mg oral tablet, extended release  -- 1 tab(s) by mouth once a day  -- Indication: For anemia    Klor-Con 20 mEq oral powder for reconstitution  --  by mouth once a day  -- Indication: For potassium    melatonin 3 mg oral tablet  -- 1 tab(s) by mouth once a day (at bedtime)  -- Indication: For insomnia    pantoprazole 40 mg oral delayed release tablet  -- 1 tab(s) by mouth once a day  -- Indication: For gerd    levothyroxine 100 mcg (0.1 mg) oral tablet  -- 1 tab(s) by mouth once a day  -- Indication: For Hypothyroidism, unspecified type    Vitamin B-100 oral tablet  -- 1 tab(s) by mouth once a day  -- Indication: For Vitamin    folic acid 1 mg oral tablet  -- 1 tab(s) by mouth once a day  -- Indication: For Vitamin I will START or STAY ON the medications listed below when I get home from the hospital:    lisinopril 10 mg oral tablet  -- 1 tab(s) by mouth once a day  -- Indication: For Htn    citalopram 10 mg oral tablet  -- 1 tab(s) by mouth once a day  -- Indication: For Depression    Trulicity Pen 0.75 mg/0.5 mL subcutaneous solution  -- subcutaneous once a week  -- Indication: For Dm    allopurinol 100 mg oral tablet  -- 1 tab(s) by mouth once a day  -- Indication: For gout    simvastatin 40 mg oral tablet  -- 1 tab(s) by mouth once a day (at bedtime)  -- Indication: For Hyperlipidemia    clopidogrel 75 mg oral tablet  -- 1 tab(s) by mouth once a day  start after january 1st, 2019  -- Indication: For Cvaricouse veins     risperiDONE 0.25 mg oral tablet  -- 1 tab(s) by mouth 2 times a day  -- Indication: For Bipolar affective disorder, currently manic, moderate    QUEtiapine 50 mg oral tablet  -- 1 tab(s) by mouth 2 times a day with breakfast and lunch  -- Indication: For anxiety    QUEtiapine 200 mg oral tablet  -- 1 tab(s) by mouth once a day (at bedtime)  -- Indication: For anxietyand bipolar    ALPRAZolam 0.25 mg oral tablet  -- 1 tab(s) by mouth 3 times a day, As needed, anxiety  -- Indication: For anxiety    metoprolol succinate 50 mg oral tablet, extended release  -- 1 tab(s) by mouth once a day  -- Indication: For Tachycardia    metoprolol succinate 50 mg oral tablet, extended release  -- 1 tab(s) by mouth once a day  -- Indication: For Tachycardia    Namzaric 28 mg-10 mg oral capsule, extended release  -- 1 cap(s) by mouth once a day  -- Indication: For Dementia    furosemide 20 mg oral tablet  -- 1 tab(s) by mouth once a day  -- Indication: For edema    ferrous sulfate (as elemental iron) 45 mg oral tablet, extended release  -- 1 tab(s) by mouth once a day  -- Indication: For anemia    Klor-Con 20 mEq oral powder for reconstitution  --  by mouth once a day  -- Indication: For potassium    melatonin 3 mg oral tablet  -- 1 tab(s) by mouth once a day (at bedtime)  -- Indication: For insomnia    pantoprazole 40 mg oral delayed release tablet  -- 1 tab(s) by mouth once a day  -- Indication: For gerd    levothyroxine 100 mcg (0.1 mg) oral tablet  -- 1 tab(s) by mouth once a day  -- Indication: For Hypothyroidism, unspecified type    Vitamin B-100 oral tablet  -- 1 tab(s) by mouth once a day  -- Indication: For Vitamin    folic acid 1 mg oral tablet  -- 1 tab(s) by mouth once a day  -- Indication: For Vitamin I will START or STAY ON the medications listed below when I get home from the hospital:    lisinopril 10 mg oral tablet  -- 1 tab(s) by mouth once a day  -- Indication: For Htn    citalopram 10 mg oral tablet  -- 1 tab(s) by mouth once a day  -- Indication: For Depression    Trulicity Pen 0.75 mg/0.5 mL subcutaneous solution  -- subcutaneous once a week  -- Indication: For Dm    allopurinol 100 mg oral tablet  -- 1 tab(s) by mouth once a day  -- Indication: For gout    simvastatin 40 mg oral tablet  -- 1 tab(s) by mouth once a day (at bedtime)  -- Indication: For Hyperlipidemia    clopidogrel 75 mg oral tablet  -- 1 tab(s) by mouth once a day  start after 12/20/18  -- Indication: For Chronic venouse stasis phlebitis hx     QUEtiapine 50 mg oral tablet  -- 1 tab(s) by mouth 2 times a day with breakfast and lunch  -- Indication: For anxiety    QUEtiapine 200 mg oral tablet  -- 1 tab(s) by mouth once a day (at bedtime)  -- Indication: For anxietyand bipolar    risperiDONE 0.25 mg oral tablet  -- 1 tab(s) by mouth 2 times a day  -- Indication: For Bipolar affective disorder, currently manic, moderate    ALPRAZolam 0.25 mg oral tablet  -- 1 tab(s) by mouth 3 times a day, As needed, anxiety  -- Indication: For anxiety    metoprolol succinate 50 mg oral tablet, extended release  -- 1 tab(s) by mouth once a day  -- Indication: For Tachycardia    metoprolol succinate 50 mg oral tablet, extended release  -- 1 tab(s) by mouth once a day  -- Indication: For Tachycardia    Namzaric 28 mg-10 mg oral capsule, extended release  -- 1 cap(s) by mouth once a day  -- Indication: For Dementia    furosemide 20 mg oral tablet  -- 1 tab(s) by mouth once a day  -- Indication: For edema    ferrous sulfate (as elemental iron) 45 mg oral tablet, extended release  -- 1 tab(s) by mouth once a day  -- Indication: For anemia    Klor-Con 20 mEq oral powder for reconstitution  --  by mouth once a day  -- Indication: For potassium    melatonin 3 mg oral tablet  -- 1 tab(s) by mouth once a day (at bedtime)  -- Indication: For insomnia    pantoprazole 40 mg oral delayed release tablet  -- 1 tab(s) by mouth once a day  -- Indication: For gerd    levothyroxine 100 mcg (0.1 mg) oral tablet  -- 1 tab(s) by mouth once a day  -- Indication: For Hypothyroidism, unspecified type    Vitamin B-100 oral tablet  -- 1 tab(s) by mouth once a day  -- Indication: For Vitamin    folic acid 1 mg oral tablet  -- 1 tab(s) by mouth once a day  -- Indication: For Vitamin

## 2018-12-12 NOTE — DISCHARGE NOTE ADULT - MEDICATION SUMMARY - MEDICATIONS TO STOP TAKING
I will STOP taking the medications listed below when I get home from the hospital:    BuSpar  --    Januvia 50 mg oral tablet  -- 1 tab(s) by mouth once a day    metFORMIN 500 mg oral tablet  -- 1 tab(s) by mouth 2 times a day

## 2018-12-12 NOTE — PROGRESS NOTE ADULT - SUBJECTIVE AND OBJECTIVE BOX
PCP  Subjective:   in chair , awake , denies pain , confused to date ,     Objective:   Vital Signs Last 24 Hrs  T(C): 36.6 (18 @ 04:21), Max: 36.6 (18 @ 04:21)  T(F): 97.8 (18 @ 04:21), Max: 97.8 (18 @ 04:21)  HR: 79 (18 @ 04:21) (76 - 130)  BP: 108/71 (18 @ 04:21) (108/70 - 121/67)  BP(mean): 85 (18 @ 22:04) (85 - 85)  RR: 17 (18 @ 04:21) (16 - 19)  SpO2: 92% (18 @ 04:21) (92% - 97%)  Daily     Daily Weight in k.5 (12 Dec 2018 04:21)    GENERAL:  wdwn obese female , awake , alert responsive , but confused , mild sob   EYES: eomi michele  NECK: supple no mass  CHEST/LUNG: clear , but poor air movement  HEART: s1 s2 regular   ABDOMEN:  soft nt obese , + bs, obese ,   EXTREMITIES: no edema   SKIN:  warm ,   CNS:  awake , alert , non focal , confused , weak , immobility issues due to morbid obesity  MSK:  able to move and ambulate with walker ,       Allergies: Allergies    No Known Allergies    Intolerances        Home Medications:  allopurinol 100 mg oral tablet: 1 tab(s) orally once a day (08 Dec 2018 19:32)  ALPRAZolam 0.25 mg oral tablet: 1 tab(s) orally once a day (08 Dec 2018 19:32)  citalopram 10 mg oral tablet: 1 tab(s) orally once a day (08 Dec 2018 19:32)  clopidogrel 75 mg oral tablet: 1 tab(s) orally once a day (08 Dec 2018 19:32)  ferrous sulfate (as elemental iron) 45 mg oral tablet, extended release: 1 tab(s) orally once a day (08 Dec 2018 19:32)  folic acid 1 mg oral tablet: 1 tab(s) orally once a day (08 Dec 2018 19:32)  furosemide 20 mg oral tablet: 1 tab(s) orally once a day (08 Dec 2018 19:32)  Klor-Con 20 mEq oral powder for reconstitution:  orally once a day (08 Dec 2018 19:32)  levothyroxine 100 mcg (0.1 mg) oral tablet: 1 tab(s) orally once a day (08 Dec 2018 19:32)  lisinopril 10 mg oral tablet: 1 tab(s) orally once a day (08 Dec 2018 19:32)  Namzaric 28 mg-10 mg oral capsule, extended release: 1 cap(s) orally once a day (08 Dec 2018 19:32)  QUEtiapine 200 mg oral tablet: 1 tab(s) orally once a day (at bedtime) (08 Dec 2018 19:32)  QUEtiapine 50 mg oral tablet: 1 tab(s) orally 2 times a day with breakfast and lunch (08 Dec 2018 19:32)  simvastatin 40 mg oral tablet: 1 tab(s) orally once a day (at bedtime) (08 Dec 2018 19:32)  tobramycin ophthalmic 0.3% ophthalmic solution: 1 drop(s) to each affected eye 4 times a day (08 Dec 2018 19:32)  Trulicity Pen 0.75 mg/0.5 mL subcutaneous solution: subcutaneous once a week (08 Dec 2018 19:32)  Vitamin B-100 oral tablet: 1 tab(s) orally once a day (08 Dec 2018 19:32)    Medications:   ALPRAZolam 0.25 milliGRAM(s) Oral three times a day  dextrose 40% Gel 15 Gram(s) Oral once PRN  dextrose 5%. 1000 milliLiter(s) IV Continuous <Continuous>  dextrose 50% Injectable 12.5 Gram(s) IV Push once  dextrose 50% Injectable 25 Gram(s) IV Push once  dextrose 50% Injectable 25 Gram(s) IV Push once  folic acid 1 milliGRAM(s) Oral daily  glucagon  Injectable 1 milliGRAM(s) IntraMuscular once PRN  insulin lispro (HumaLOG) corrective regimen sliding scale   SubCutaneous three times a day before meals  melatonin 3 milliGRAM(s) Oral at bedtime  metoprolol tartrate Injectable 5 milliGRAM(s) IV Push every 6 hours  pantoprazole  Injectable 40 milliGRAM(s) IV Push daily  QUEtiapine 50 milliGRAM(s) Oral two times a day  QUEtiapine 100 milliGRAM(s) Oral at bedtime  risperiDONE   Tablet 0.25 milliGRAM(s) Oral two times a day  sodium chloride 0.9%. 1000 milliLiter(s) IV Continuous <Continuous>  tobramycin 0.3% Ophthalmic Solution 1 Drop(s) Both EYES four times a day      LABS:                        11.0   9.03  )-----------( 140      ( 11 Dec 2018 08:29 )             34.2         137  |  108  |  58<H>  ----------------------------<  104<H>  4.9   |  23  |  2.20<H>    Ca    8.7      11 Dec 2018 08:29        12-08 @ 15:05  INR 1.12        CAPILLARY BLOOD GLUCOSE      POCT Blood Glucose.: 125 mg/dL (12 Dec 2018 07:38)  POCT Blood Glucose.: 104 mg/dL (11 Dec 2018 16:45)  POCT Blood Glucose.: 151 mg/dL (11 Dec 2018 11:39)          RECENT CULTURES:

## 2018-12-12 NOTE — DISCHARGE NOTE ADULT - CARE PROVIDER_API CALL
Nayan Boyd), Internal Medicine  [address] [place] [address]  Phone: [phone]  Fax: [phone]

## 2018-12-12 NOTE — PROGRESS NOTE ADULT - ASSESSMENT
82 yo f pmx htn, dm, hypothyroidism, ckd, varicoses vein, dementia admitted for rectal bleeding.  Called for 2 episodes of sustained tachycardia into 130/140s early last night and this morning.  Have now resolved, HR now steady in 95s.    - With episodes of tachycardia overnight up to 120's.  Unable to clearly determine rhythm from tele strips.  However, I am leaning away from sinus tach and more toward atrial flutter with 2:1 conduction, or sustained reentrant SVT.  Please obtain 12 lead EKG while patient is tachycardic to better elucidate rhythm.  If in fact, patient is in Aflutter or Afib, having [id number] score of 5, she will need AC for thromboembolic prevention after her GI issue is resolved and when she is cleared by GI.  For now, will start low dose BB for rate control  - Continue to monitor on tele  - Unsure why she is on Plavix, but may continue to hold Plavix in setting of bleed.  GI following, planned for EGD/colonoscopy tomorrow.  Will need to evaluate her response to metoprolol and observe her heart rhythm over the next 24h, and will comment tomorrow on her candidacy for the procedure.  - No clear evidence of acute ischemia or volume overload, continue statin  - Non pitting edema on left ankle/foot, has had in the past, on lasix at home, continue to hold for now in setting of dehydration  - No acute changes on EKG compared to previous  - No previous echo done, never seen cardiologist outpt, no other significant cardiac hx  - Agree to hold ACEI for episodes of hypotension.  May reintroduce when more stable  - Monitor and replete lytes, keep K>4, Mg>2.  - Other cardiovascular workup will depend on clinical course.  - All other workup per primary team.  - Will follow. 82 yo f pmx htn, dm, hypothyroidism, ckd, varicoses vein, dementia admitted for rectal bleeding.  Called for 2 episodes of sustained tachycardia into 130/140s early last night and this morning.  Have now resolved, HR now steady in 95s.    - No further episodes of tachycardia overnight.  Unable to clearly determine rhythm from tele strips.     - Placed on IV Lopressor while NPO  - Continue to monitor on tele  - Unsure why she is on Plavix, but may continue to hold Plavix in setting of bleed.   - FOBT positive, for EDG/colonoscopy.  No contraindication for planned procedure from cardiac standpoint.  - No clear evidence of acute ischemia or volume overload  - Continue statin  - Non pitting edema on left ankle/foot, has had in the past, on lasix at home, continue to hold for now in setting of dehydration  - No acute changes on EKG compared to previous  - No previous echo done, never seen cardiologist outpt, no other significant cardiac hx  - Agree to hold ACEI for episodes of hypotension.  May reintroduce when more stable  - Monitor and replete lytes, keep K>4, Mg>2.  - Other cardiovascular workup will depend on clinical course.  - All other workup per primary team.  - Will follow.     Florence Martinez NP  Cardiology

## 2018-12-12 NOTE — DISCHARGE NOTE ADULT - PATIENT PORTAL LINK FT
You can access the [facility] Patient Portal, offered by [facility], by registering with the following website: [email or web address]

## 2018-12-12 NOTE — DISCHARGE NOTE ADULT - SECONDARY DIAGNOSIS.
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

## 2018-12-12 NOTE — PROGRESS NOTE ADULT - ASSESSMENT
· Chief Complaint: The patient is a 83y Female complaining of rectal bleeding.	  · Unable to Obtain: Dementia	  · HPI Objective Statement: 83 female presents to ER with daughters and home aide with report of dark blood in the stool. Patient had BM today and aide noted the blood in the toilet. Patient has dementia, poor historian, history obtained from the daughter.	    PAST MEDICAL/SURGICAL/FAMILY/SOCIAL HISTORY:    Past Medical History:  Diabetes mellitus    Hypertension    Hypothyroidism.  obesity  Phlebitis   varicose veins     81 yo f,  , lives with 24/7 HHA and dtr helps out , has hx of obesity , diabetes mellitus type 2 , ckd stage 4,  bipolar , and anxiety , and varicose veins , who presents with rectal bleed , no abd pain , no nausea no vomiting ,   patient is dnr and dni per mols and ACP reviewed with dtr ,   admit and monitor for gi bleed and work up  on 12/11/18 patient ate BF , and feels ok over all , denies cp , mild sob , patient is tachycardic and order written for lopressor 5 mg , and nurse said that residents were not available to administer and I administered it slow iv push , checked with Telemetry and tachycardia broke at 9:12 am immediately after iv push lopressor and in NSR at about 80 bpm ,  plan colonoscopy , dtr aware , may need rehab , cardiology to f/u for HR issues ,   on 12/12/18 awaiting colonoscopy , patient may not have drank the prep completely , plan dc when stable hopefully by am with HHA

## 2018-12-12 NOTE — PROGRESS NOTE ADULT - SUBJECTIVE AND OBJECTIVE BOX
[facility] -- Preeti Ortiz, Eugenia Newby, Medardo Coats Savella  Office # [phone]    Follow Up:  Tachy/Preop Eval    Subjective/Observations: No cardiac complaints.  Denies bloody stools.  HR controlled per tele    REVIEW OF SYSTEMS: All other review of systems is negative unless indicated above    PAST MEDICAL & SURGICAL HISTORY:  Hypothyroidism  Hypertension  Diabetes mellitus  S/P knee replacement, bilateral    MEDICATIONS  (STANDING):  ALPRAZolam 0.25 milliGRAM(s) Oral three times a day  dextrose 5%. 1000 milliLiter(s) (50 mL/Hr) IV Continuous <Continuous>  dextrose 50% Injectable 12.5 Gram(s) IV Push once  dextrose 50% Injectable 25 Gram(s) IV Push once  dextrose 50% Injectable 25 Gram(s) IV Push once  folic acid 1 milliGRAM(s) Oral daily  insulin lispro (HumaLOG) corrective regimen sliding scale   SubCutaneous three times a day before meals  melatonin 3 milliGRAM(s) Oral at bedtime  metoprolol tartrate Injectable 5 milliGRAM(s) IV Push every 6 hours  pantoprazole  Injectable 40 milliGRAM(s) IV Push daily  QUEtiapine 50 milliGRAM(s) Oral two times a day  QUEtiapine 100 milliGRAM(s) Oral at bedtime  risperiDONE   Tablet 0.25 milliGRAM(s) Oral two times a day  sodium chloride 0.9%. 1000 milliLiter(s) (50 mL/Hr) IV Continuous <Continuous>  tobramycin 0.3% Ophthalmic Solution 1 Drop(s) Both EYES four times a day    MEDICATIONS  (PRN):  dextrose 40% Gel 15 Gram(s) Oral once PRN Blood Glucose LESS THAN 70 milliGRAM(s)/deciliter  glucagon  Injectable 1 milliGRAM(s) IntraMuscular once PRN Glucose LESS THAN 70 milligrams/deciliter      Allergies    No Known Allergies    Intolerances        Vital Signs Last 24 Hrs  T(C): 36.5 (12 Dec 2018 14:16), Max: 36.6 (12 Dec 2018 04:21)  T(F): 97.7 (12 Dec 2018 14:16), Max: 97.8 (12 Dec 2018 04:21)  HR: 74 (12 Dec 2018 14:16) (73 - 86)  BP: 110/65 (12 Dec 2018 14:16) (104/60 - 121/67)  BP(mean): 85 (11 Dec 2018 22:04) (85 - 85)  RR: 15 (12 Dec 2018 14:16) (15 - 19)  SpO2: 96% (12 Dec 2018 14:16) (92% - 97%)    I&O's Summary        PHYSICAL EXAM:  TELE:   Constitutional: NAD, awake and alert, well-developed  HEENT: Moist Mucous Membranes, Anicteric  Pulmonary: Non-labored, breath sounds are clear bilaterally, No wheezing, rales or rhonchi  Cardiovascular: Regular, S1 and S2, No murmurs, rubs, gallops or clicks  Gastrointestinal: Bowel Sounds present, soft, nontender.   Lymph: No peripheral edema. No lymphadenopathy.  Skin: No visible rashes or ulcers.  Psych:  Mood & affect appropriate    LABS: All Labs Reviewed:                        11.0   9.03  )-----------( 140      ( 11 Dec 2018 08:29 )             34.2                         10.2   8.67  )-----------( 134      ( 10 Dec 2018 08:28 )             32.1     11 Dec 2018 08:29    137    |  108    |  58     ----------------------------<  104    4.9     |  23     |  2.20     Ca    8.7        11 Dec 2018 08:29  Phos  4.3       12 Dec 2018 10:19  Mg     2.0       12 Dec 2018 10:19    < from: Xray Chest 1 View- PORTABLE-Urgent (12.08.18 @ 14:42) >    EXAM:  XR CHEST PORTABLE URGENT 1V                          PROCEDURE DATE:  12/08/2018      INTERPRETATION:  Clinical History: Rectal bleed.    Comparison: None.      Findings:  The heart and mediastinal contours are normal. No segmental   infiltrates or effusions are observed. No pneumothorax is noted.       IMPRESSION:   1. Negative Chest.    DALLAS LEMONS M.D., ATTENDING RADIOLOGIST  This document has been electronically signed. Dec  8 2018  3:03PM      < end of copied text >

## 2018-12-13 LAB
ANION GAP SERPL CALC-SCNC: 9 MMOL/L — SIGNIFICANT CHANGE UP (ref 5–17)
BASOPHILS # BLD AUTO: 0.02 K/UL — SIGNIFICANT CHANGE UP (ref 0–0.2)
BASOPHILS NFR BLD AUTO: 0.3 % — SIGNIFICANT CHANGE UP (ref 0–2)
BUN SERPL-MCNC: 49 MG/DL — HIGH (ref 7–23)
CALCIUM SERPL-MCNC: 7.7 MG/DL — LOW (ref 8.5–10.1)
CHLORIDE SERPL-SCNC: 108 MMOL/L — SIGNIFICANT CHANGE UP (ref 96–108)
CO2 SERPL-SCNC: 24 MMOL/L — SIGNIFICANT CHANGE UP (ref 22–31)
CREAT SERPL-MCNC: 1.7 MG/DL — HIGH (ref 0.5–1.3)
EOSINOPHIL # BLD AUTO: 0.19 K/UL — SIGNIFICANT CHANGE UP (ref 0–0.5)
EOSINOPHIL NFR BLD AUTO: 2.5 % — SIGNIFICANT CHANGE UP (ref 0–6)
GLUCOSE SERPL-MCNC: 86 MG/DL — SIGNIFICANT CHANGE UP (ref 70–99)
HCT VFR BLD CALC: 29.9 % — LOW (ref 34.5–45)
HGB BLD-MCNC: 9.7 G/DL — LOW (ref 11.5–15.5)
IMM GRANULOCYTES NFR BLD AUTO: 0.5 % — SIGNIFICANT CHANGE UP (ref 0–1.5)
LYMPHOCYTES # BLD AUTO: 1.53 K/UL — SIGNIFICANT CHANGE UP (ref 1–3.3)
LYMPHOCYTES # BLD AUTO: 19.8 % — SIGNIFICANT CHANGE UP (ref 13–44)
MCHC RBC-ENTMCNC: 32.4 GM/DL — SIGNIFICANT CHANGE UP (ref 32–36)
MCHC RBC-ENTMCNC: 33.2 PG — SIGNIFICANT CHANGE UP (ref 27–34)
MCV RBC AUTO: 102.4 FL — HIGH (ref 80–100)
MONOCYTES # BLD AUTO: 0.58 K/UL — SIGNIFICANT CHANGE UP (ref 0–0.9)
MONOCYTES NFR BLD AUTO: 7.5 % — SIGNIFICANT CHANGE UP (ref 2–14)
NEUTROPHILS # BLD AUTO: 5.38 K/UL — SIGNIFICANT CHANGE UP (ref 1.8–7.4)
NEUTROPHILS NFR BLD AUTO: 69.4 % — SIGNIFICANT CHANGE UP (ref 43–77)
NRBC # BLD: 0 /100 WBCS — SIGNIFICANT CHANGE UP (ref 0–0)
PLATELET # BLD AUTO: 129 K/UL — LOW (ref 150–400)
POTASSIUM SERPL-MCNC: 4.4 MMOL/L — SIGNIFICANT CHANGE UP (ref 3.5–5.3)
POTASSIUM SERPL-SCNC: 4.4 MMOL/L — SIGNIFICANT CHANGE UP (ref 3.5–5.3)
RBC # BLD: 2.92 M/UL — LOW (ref 3.8–5.2)
RBC # FLD: 13.7 % — SIGNIFICANT CHANGE UP (ref 10.3–14.5)
SODIUM SERPL-SCNC: 141 MMOL/L — SIGNIFICANT CHANGE UP (ref 135–145)
WBC # BLD: 7.74 K/UL — SIGNIFICANT CHANGE UP (ref 3.8–10.5)
WBC # FLD AUTO: 7.74 K/UL — SIGNIFICANT CHANGE UP (ref 3.8–10.5)

## 2018-12-13 PROCEDURE — 99232 SBSQ HOSP IP/OBS MODERATE 35: CPT

## 2018-12-13 RX ORDER — ALPRAZOLAM 0.25 MG
1 TABLET ORAL
Qty: 0 | Refills: 0 | COMMUNITY
Start: 2018-12-13

## 2018-12-13 RX ORDER — METOPROLOL TARTRATE 50 MG
50 TABLET ORAL DAILY
Qty: 0 | Refills: 0 | Status: DISCONTINUED | OUTPATIENT
Start: 2018-12-13 | End: 2018-12-14

## 2018-12-13 RX ORDER — CLOPIDOGREL BISULFATE 75 MG/1
1 TABLET, FILM COATED ORAL
Qty: 0 | Refills: 0 | COMMUNITY

## 2018-12-13 RX ORDER — ALPRAZOLAM 0.25 MG
0.25 TABLET ORAL THREE TIMES A DAY
Qty: 0 | Refills: 0 | Status: DISCONTINUED | OUTPATIENT
Start: 2018-12-13 | End: 2018-12-14

## 2018-12-13 RX ORDER — METOPROLOL TARTRATE 50 MG
1 TABLET ORAL
Qty: 0 | Refills: 0 | COMMUNITY
Start: 2018-12-13

## 2018-12-13 RX ADMIN — Medication 1 DROP(S): at 17:25

## 2018-12-13 RX ADMIN — Medication 1 DROP(S): at 05:35

## 2018-12-13 RX ADMIN — RISPERIDONE 0.25 MILLIGRAM(S): 4 TABLET ORAL at 05:34

## 2018-12-13 RX ADMIN — Medication 1: at 12:00

## 2018-12-13 RX ADMIN — Medication 5 MILLIGRAM(S): at 03:12

## 2018-12-13 RX ADMIN — Medication 1 DROP(S): at 11:35

## 2018-12-13 RX ADMIN — QUETIAPINE FUMARATE 50 MILLIGRAM(S): 200 TABLET, FILM COATED ORAL at 11:35

## 2018-12-13 RX ADMIN — Medication 50 MILLIGRAM(S): at 11:35

## 2018-12-13 RX ADMIN — Medication 0.25 MILLIGRAM(S): at 21:03

## 2018-12-13 RX ADMIN — RISPERIDONE 0.25 MILLIGRAM(S): 4 TABLET ORAL at 17:25

## 2018-12-13 RX ADMIN — PANTOPRAZOLE SODIUM 40 MILLIGRAM(S): 20 TABLET, DELAYED RELEASE ORAL at 11:35

## 2018-12-13 RX ADMIN — Medication 0.25 MILLIGRAM(S): at 05:34

## 2018-12-13 RX ADMIN — QUETIAPINE FUMARATE 50 MILLIGRAM(S): 200 TABLET, FILM COATED ORAL at 07:54

## 2018-12-13 RX ADMIN — QUETIAPINE FUMARATE 100 MILLIGRAM(S): 200 TABLET, FILM COATED ORAL at 21:02

## 2018-12-13 RX ADMIN — Medication 3 MILLIGRAM(S): at 21:02

## 2018-12-13 RX ADMIN — Medication 1 MILLIGRAM(S): at 11:35

## 2018-12-13 NOTE — PROGRESS NOTE ADULT - SUBJECTIVE AND OBJECTIVE BOX
PCP  Subjective:   in bed , awake , alert non focal but confused    Objective:   Vital Signs Last 24 Hrs  T(C): 36.2 (12-13-18 @ 13:48), Max: 36.6 (12-12-18 @ 21:43)  T(F): 97.2 (12-13-18 @ 13:48), Max: 97.8 (12-12-18 @ 21:43)  HR: 68 (12-13-18 @ 13:48) (68 - 75)  BP: 129/88 (12-13-18 @ 13:48) (109/64 - 137/79)  BP(mean): 80 (12-12-18 @ 21:43) (80 - 80)  RR: 16 (12-13-18 @ 13:48) (15 - 16)  SpO2: 90% (12-13-18 @ 05:58) (90% - 93%)  Daily     Daily     GENERAL:  wdwn obese female , awake , alert responsive , but confused , mild sob   EYES: eomi michele  NECK: supple no mass  CHEST/LUNG: clear , but poor air movement  HEART: s1 s2 regular   ABDOMEN:  soft nt obese , + bs, obese ,   EXTREMITIES: no edema   SKIN:  warm ,   CNS:  awake , alert , non focal , confused , weak , immobility issues due to morbid obesity  MSK:  able to move and ambulate with walker ,     Allergies: Allergies    No Known Allergies    Intolerances        Home Medications:  allopurinol 100 mg oral tablet: 1 tab(s) orally once a day (08 Dec 2018 19:32)  citalopram 10 mg oral tablet: 1 tab(s) orally once a day (08 Dec 2018 19:32)  clopidogrel 75 mg oral tablet: 1 tab(s) orally once a day (08 Dec 2018 19:32)  ferrous sulfate (as elemental iron) 45 mg oral tablet, extended release: 1 tab(s) orally once a day (08 Dec 2018 19:32)  folic acid 1 mg oral tablet: 1 tab(s) orally once a day (08 Dec 2018 19:32)  furosemide 20 mg oral tablet: 1 tab(s) orally once a day (08 Dec 2018 19:32)  Klor-Con 20 mEq oral powder for reconstitution:  orally once a day (08 Dec 2018 19:32)  levothyroxine 100 mcg (0.1 mg) oral tablet: 1 tab(s) orally once a day (08 Dec 2018 19:32)  lisinopril 10 mg oral tablet: 1 tab(s) orally once a day (08 Dec 2018 19:32)  melatonin 3 mg oral tablet: 1 tab(s) orally once a day (at bedtime) (12 Dec 2018 08:41)  metoprolol succinate 50 mg oral tablet, extended release: 1 tab(s) orally once a day (12 Dec 2018 08:45)  Namzaric 28 mg-10 mg oral capsule, extended release: 1 cap(s) orally once a day (08 Dec 2018 19:32)  pantoprazole 40 mg oral delayed release tablet: 1 tab(s) orally once a day (12 Dec 2018 08:41)  QUEtiapine 200 mg oral tablet: 1 tab(s) orally once a day (at bedtime) (08 Dec 2018 19:32)  QUEtiapine 50 mg oral tablet: 1 tab(s) orally 2 times a day with breakfast and lunch (08 Dec 2018 19:32)  risperiDONE 0.25 mg oral tablet: 1 tab(s) orally 2 times a day (12 Dec 2018 08:41)  simvastatin 40 mg oral tablet: 1 tab(s) orally once a day (at bedtime) (08 Dec 2018 19:32)  Trulicity Pen 0.75 mg/0.5 mL subcutaneous solution: subcutaneous once a week (08 Dec 2018 19:32)  Vitamin B-100 oral tablet: 1 tab(s) orally once a day (08 Dec 2018 19:32)    Medications:   ALPRAZolam 0.25 milliGRAM(s) Oral three times a day PRN  dextrose 40% Gel 15 Gram(s) Oral once PRN  dextrose 5%. 1000 milliLiter(s) IV Continuous <Continuous>  dextrose 50% Injectable 12.5 Gram(s) IV Push once  dextrose 50% Injectable 25 Gram(s) IV Push once  dextrose 50% Injectable 25 Gram(s) IV Push once  folic acid 1 milliGRAM(s) Oral daily  glucagon  Injectable 1 milliGRAM(s) IntraMuscular once PRN  insulin lispro (HumaLOG) corrective regimen sliding scale   SubCutaneous three times a day before meals  melatonin 3 milliGRAM(s) Oral at bedtime  metoprolol succinate ER 50 milliGRAM(s) Oral daily  pantoprazole  Injectable 40 milliGRAM(s) IV Push daily  QUEtiapine 50 milliGRAM(s) Oral two times a day  QUEtiapine 100 milliGRAM(s) Oral at bedtime  risperiDONE   Tablet 0.25 milliGRAM(s) Oral two times a day  sodium chloride 0.9%. 1000 milliLiter(s) IV Continuous <Continuous>  tobramycin 0.3% Ophthalmic Solution 1 Drop(s) Both EYES four times a day      LABS:                        9.7    7.74  )-----------( 129      ( 13 Dec 2018 08:00 )             29.9     12-13    141  |  108  |  49<H>  ----------------------------<  86  4.4   |  24  |  1.70<H>    Ca    7.7<L>      13 Dec 2018 08:00  Phos  4.3     12-12  Mg     2.0     12-12              CAPILLARY BLOOD GLUCOSE      POCT Blood Glucose.: 164 mg/dL (13 Dec 2018 11:11)  POCT Blood Glucose.: 107 mg/dL (13 Dec 2018 07:41)  POCT Blood Glucose.: 102 mg/dL (12 Dec 2018 16:59)          RECENT CULTURES:

## 2018-12-13 NOTE — PROGRESS NOTE ADULT - SUBJECTIVE AND OBJECTIVE BOX
[facility] -- Preeti Ortiz, Eugenia Newby Pannella, Patel, Savella  Office # [phone]      Follow Up:    Tachy/Preop Eval  Subjective/Observations:   No events overnight resting comfortably in bed without complaints     REVIEW OF SYSTEMS: All other review of systems is negative unless indicated above    PAST MEDICAL & SURGICAL HISTORY:  Hypothyroidism  Hypertension  Diabetes mellitus  S/P knee replacement, bilateral      MEDICATIONS  (STANDING):  dextrose 5%. 1000 milliLiter(s) (50 mL/Hr) IV Continuous <Continuous>  dextrose 50% Injectable 12.5 Gram(s) IV Push once  dextrose 50% Injectable 25 Gram(s) IV Push once  dextrose 50% Injectable 25 Gram(s) IV Push once  folic acid 1 milliGRAM(s) Oral daily  insulin lispro (HumaLOG) corrective regimen sliding scale   SubCutaneous three times a day before meals  melatonin 3 milliGRAM(s) Oral at bedtime  metoprolol succinate ER 50 milliGRAM(s) Oral daily  pantoprazole  Injectable 40 milliGRAM(s) IV Push daily  QUEtiapine 50 milliGRAM(s) Oral two times a day  QUEtiapine 100 milliGRAM(s) Oral at bedtime  risperiDONE   Tablet 0.25 milliGRAM(s) Oral two times a day  sodium chloride 0.9%. 1000 milliLiter(s) (50 mL/Hr) IV Continuous <Continuous>  tobramycin 0.3% Ophthalmic Solution 1 Drop(s) Both EYES four times a day    MEDICATIONS  (PRN):  ALPRAZolam 0.25 milliGRAM(s) Oral three times a day PRN anxiety  dextrose 40% Gel 15 Gram(s) Oral once PRN Blood Glucose LESS THAN 70 milliGRAM(s)/deciliter  glucagon  Injectable 1 milliGRAM(s) IntraMuscular once PRN Glucose LESS THAN 70 milligrams/deciliter      Allergies    No Known Allergies    Intolerances        Vital Signs Last 24 Hrs  T(C): 36.5 (13 Dec 2018 05:58), Max: 36.6 (12 Dec 2018 21:43)  T(F): 97.7 (13 Dec 2018 05:58), Max: 97.8 (12 Dec 2018 21:43)  HR: 71 (13 Dec 2018 05:58) (71 - 75)  BP: 137/79 (13 Dec 2018 05:58) (104/60 - 137/79)  BP(mean): 80 (12 Dec 2018 21:43) (80 - 80)  RR: 16 (13 Dec 2018 05:58) (15 - 16)  SpO2: 90% (13 Dec 2018 05:58) (90% - 97%)    I&O's Summary    12 Dec 2018 07:01  -  13 Dec 2018 07:00  --------------------------------------------------------  IN: 120 mL / OUT: 0 mL / NET: 120 mL          PHYSICAL EXAM:  TELE:   Constitutional: NAD, awake and alert, well-developed  HEENT: Moist Mucous Membranes, Anicteric  Pulmonary: Non-labored, breath sounds are clear bilaterally, No wheezing, crackles or rhonchi  Cardiovascular: Regular, S1 and S2 nl, No murmurs, rubs, gallops or clicks  Gastrointestinal: Bowel Sounds present, soft, nontender.   Lymph: No lymphadenopathy. No peripheral edema.  Skin: No visible rashes or ulcers.  Psych:  Mood & affect appropriate    LABS: All Labs Reviewed:                        9.7    7.74  )-----------( 129      ( 13 Dec 2018 08:00 )             29.9                         11.0   9.03  )-----------( 140      ( 11 Dec 2018 08:29 )             34.2     13 Dec 2018 08:00    141    |  108    |  49     ----------------------------<  86     4.4     |  24     |  1.70   11 Dec 2018 08:29    137    |  108    |  58     ----------------------------<  104    4.9     |  23     |  2.20     Ca    7.7        13 Dec 2018 08:00  Ca    8.7        11 Dec 2018 08:29  Phos  4.3       12 Dec 2018 10:19  Mg     2.0       12 Dec 2018 10:19               ECG:  < from: 12 Lead ECG (12.11.18 @ 08:47) >  Ventricular Rate 133 BPM    Atrial Rate 133 BPM    P-R Interval 222 ms    QRS Duration 72 ms    Q-T Interval 304 ms    QTC Calculation(Bezet) 452 ms    P Axis 23 degrees    R Axis 24 degrees    T Axis 93 degrees    Diagnosis Line Sinus tachycardia with 1st degree AV block  vs long rp tachycardia  Low voltage QRS  Cannot rule out Anteroseptal infarct (cited on or before 08-DEC-2018)  Abnormal ECG  When compared with ECG of 10-DEC-2018 12:53,  ST no longer elevated in Inferior leads  Confirmed byAnival Bello MD ([id number]) on 12/11/2018 11:40:59 AM    < end of copied text >    Echo:    Radiology:  < from: Xray Chest 1 View- PORTABLE-Urgent (12.08.18 @ 14:42) >  EXAM:  XR CHEST PORTABLE URGENT 1V                            PROCEDURE DATE:  12/08/2018          INTERPRETATION:  Clinical History: Rectal bleed.    Comparison: None.      Findings:  The heart and mediastinal contours are normal. No segmental   infiltrates or effusions are observed. No pneumothorax is noted.       IMPRESSION:   1. Negative Chest.                DALLAS LEMONS M.D., ATTENDING RADIOLOGIST  This document has been electronically signed. Dec  8 2018  3:03PM    < end of copied text >           Alfred Schneider [facility]   Cardiology

## 2018-12-13 NOTE — PROGRESS NOTE ADULT - ASSESSMENT
84 yo f pmx htn, dm, hypothyroidism, ckd, varicoses vein, dementia admitted for rectal bleeding.  Called for 2 episodes of sustained tachycardia into 130/140s early last night and this morning.  Have now resolved, HR now steady in 95s.    - No further episodes of tachycardia overnight.    - C/W BB  - Unsure why she is on Plavix, but may continue to hold Plavix in setting of bleed.   - FOBT positive, for EDG/colonoscopy.  No contraindication for planned procedure from cardiac standpoint.  - No clear evidence of acute ischemia or volume overload  - Continue statin  - Non pitting edema on left ankle/foot, has had in the past, on lasix at home, continue to hold for now in setting of dehydration  - No acute changes on EKG compared to previous  - No previous echo done, never seen cardiologist outpt, no other significant cardiac hx  - Agree to hold ACEI for episodes of hypotension.  May reintroduce when more stable  - Monitor and replete lytes, keep K>4, Mg>2.  - Other cardiovascular workup will depend on clinical course.  - All other workup per primary team.  - Will follow.     Alfred Schneider ANP  Cardiology

## 2018-12-13 NOTE — PROGRESS NOTE ADULT - SUBJECTIVE AND OBJECTIVE BOX
INTERVAL HPI/OVERNIGHT EVENTS:  pt seen and examined  some confusion but denies abd pain  per overnight rn no s/s active gib  afebrile overnight labs noted  sp egd/colon yesterday    MEDICATIONS  (STANDING):  ALPRAZolam 0.25 milliGRAM(s) Oral three times a day  dextrose 5%. 1000 milliLiter(s) (50 mL/Hr) IV Continuous <Continuous>  dextrose 50% Injectable 12.5 Gram(s) IV Push once  dextrose 50% Injectable 25 Gram(s) IV Push once  dextrose 50% Injectable 25 Gram(s) IV Push once  folic acid 1 milliGRAM(s) Oral daily  insulin lispro (HumaLOG) corrective regimen sliding scale   SubCutaneous three times a day before meals  melatonin 3 milliGRAM(s) Oral at bedtime  metoprolol tartrate Injectable 5 milliGRAM(s) IV Push every 6 hours  pantoprazole  Injectable 40 milliGRAM(s) IV Push daily  QUEtiapine 50 milliGRAM(s) Oral two times a day  QUEtiapine 100 milliGRAM(s) Oral at bedtime  risperiDONE   Tablet 0.25 milliGRAM(s) Oral two times a day  sodium chloride 0.9%. 1000 milliLiter(s) (50 mL/Hr) IV Continuous <Continuous>  tobramycin 0.3% Ophthalmic Solution 1 Drop(s) Both EYES four times a day    MEDICATIONS  (PRN):  dextrose 40% Gel 15 Gram(s) Oral once PRN Blood Glucose LESS THAN 70 milliGRAM(s)/deciliter  glucagon  Injectable 1 milliGRAM(s) IntraMuscular once PRN Glucose LESS THAN 70 milligrams/deciliter      Allergies    No Known Allergies    Intolerances        Review of Systems:  unable to obtain in entirety      Vital Signs Last 24 Hrs  T(C): 36.5 (13 Dec 2018 05:58), Max: 36.6 (12 Dec 2018 21:43)  T(F): 97.7 (13 Dec 2018 05:58), Max: 97.8 (12 Dec 2018 21:43)  HR: 71 (13 Dec 2018 05:58) (71 - 75)  BP: 137/79 (13 Dec 2018 05:58) (104/60 - 137/79)  BP(mean): 80 (12 Dec 2018 21:43) (80 - 80)  RR: 16 (13 Dec 2018 05:58) (15 - 16)  SpO2: 90% (13 Dec 2018 05:58) (90% - 97%)    PHYSICAL EXAM:    Constitutional: NAD lying in bed  HEENT: ncat  Neck: No LAD  Respiratory: dec bs  Cardiovascular: S1 and S2, RRR  Gastrointestinal: obese soft nt nd  Extremities: edema  Vascular: 2+ peripheral pulses  Neurological: Awake alert  Skin: No rashes    LABS:                        9.7    7.74  )-----------( 129      ( 13 Dec 2018 08:00 )             29.9     12-13    141  |  108  |  49<H>  ----------------------------<  86  4.4   |  24  |  1.70<H>    Ca    7.7<L>      13 Dec 2018 08:00  Phos  4.3     12-12  Mg     2.0     12-12            RADIOLOGY & ADDITIONAL TESTS:

## 2018-12-13 NOTE — PROGRESS NOTE ADULT - PROBLEM SELECTOR PLAN 1
sp egd/colon- findings of small hh, gastritis, fluid seen within the colon, and small internal hemorrhoids  no further s/s gib per nursing  trend h/h, transfuse prn  cont ppi  anusol prn  diet as tolerated  dc planning per primary team sp egd/colon- findings of small hh, gastritis, fluid seen within the colon (liquid stool), and small internal hemorrhoids  no further s/s gib per nursing  trend h/h, transfuse prn  cont ppi  anusol prn  diet as tolerated  dc planning per primary team resolved  sp egd/colon- findings of small hh, gastritis, fluid seen within the colon (liquid stool), and small internal hemorrhoids  no further s/s gib per nursing  trend h/h, transfuse prn  cont ppi  anusol prn  diet as tolerated  dc planning per primary team

## 2018-12-13 NOTE — PROGRESS NOTE ADULT - ASSESSMENT
· Chief Complaint: The patient is a 83y Female complaining of rectal bleeding.	  · Unable to Obtain: Dementia	  · HPI Objective Statement: 83 female presents to ER with daughters and home aide with report of dark blood in the stool. Patient had BM today and aide noted the blood in the toilet. Patient has dementia, poor historian, history obtained from the daughter.	    PAST MEDICAL/SURGICAL/FAMILY/SOCIAL HISTORY:    Past Medical History:  Diabetes mellitus    Hypertension    Hypothyroidism.  obesity  Phlebitis   varicose veins     79 yo f,  , lives with 24/7 HHA and dtr helps out , has hx of obesity , diabetes mellitus type 2 , ckd stage 4,  bipolar , and anxiety , and varicose veins , who presents with rectal bleed , no abd pain , no nausea no vomiting ,   patient is dnr and dni per mols and ACP reviewed with dtr ,   admit and monitor for gi bleed and work up  on 12/11/18 patient ate BF , and feels ok over all , denies cp , mild sob , patient is tachycardic and order written for lopressor 5 mg , and nurse said that residents were not available to administer and I administered it slow iv push , checked with Telemetry and tachycardia broke at 9:12 am immediately after iv push lopressor and in NSR at about 80 bpm ,  plan colonoscopy , dtr aware , may need rehab , cardiology to f/u for HR issues ,   on 12/12/18 awaiting colonoscopy , patient may not have drank the prep completely , plan dc when stable hopefully by am with HHA  on 12/13/18 post colonoscopy , and egd , all stable , follow up labs , and plan dc home with HHA

## 2018-12-14 VITALS
DIASTOLIC BLOOD PRESSURE: 58 MMHG | RESPIRATION RATE: 16 BRPM | SYSTOLIC BLOOD PRESSURE: 120 MMHG | OXYGEN SATURATION: 91 % | TEMPERATURE: 97 F | HEART RATE: 73 BPM

## 2018-12-14 LAB
ANION GAP SERPL CALC-SCNC: 7 MMOL/L — SIGNIFICANT CHANGE UP (ref 5–17)
BASOPHILS # BLD AUTO: 0.02 K/UL — SIGNIFICANT CHANGE UP (ref 0–0.2)
BASOPHILS NFR BLD AUTO: 0.3 % — SIGNIFICANT CHANGE UP (ref 0–2)
BUN SERPL-MCNC: 39 MG/DL — HIGH (ref 7–23)
CALCIUM SERPL-MCNC: 8 MG/DL — LOW (ref 8.5–10.1)
CHLORIDE SERPL-SCNC: 109 MMOL/L — HIGH (ref 96–108)
CO2 SERPL-SCNC: 24 MMOL/L — SIGNIFICANT CHANGE UP (ref 22–31)
CREAT SERPL-MCNC: 1.5 MG/DL — HIGH (ref 0.5–1.3)
EOSINOPHIL # BLD AUTO: 0.21 K/UL — SIGNIFICANT CHANGE UP (ref 0–0.5)
EOSINOPHIL NFR BLD AUTO: 3 % — SIGNIFICANT CHANGE UP (ref 0–6)
GLUCOSE SERPL-MCNC: 114 MG/DL — HIGH (ref 70–99)
HCT VFR BLD CALC: 31.4 % — LOW (ref 34.5–45)
HGB BLD-MCNC: 10 G/DL — LOW (ref 11.5–15.5)
IMM GRANULOCYTES NFR BLD AUTO: 0.4 % — SIGNIFICANT CHANGE UP (ref 0–1.5)
LYMPHOCYTES # BLD AUTO: 1.8 K/UL — SIGNIFICANT CHANGE UP (ref 1–3.3)
LYMPHOCYTES # BLD AUTO: 26 % — SIGNIFICANT CHANGE UP (ref 13–44)
MCHC RBC-ENTMCNC: 31.8 GM/DL — LOW (ref 32–36)
MCHC RBC-ENTMCNC: 33.1 PG — SIGNIFICANT CHANGE UP (ref 27–34)
MCV RBC AUTO: 104 FL — HIGH (ref 80–100)
MONOCYTES # BLD AUTO: 0.51 K/UL — SIGNIFICANT CHANGE UP (ref 0–0.9)
MONOCYTES NFR BLD AUTO: 7.4 % — SIGNIFICANT CHANGE UP (ref 2–14)
NEUTROPHILS # BLD AUTO: 4.36 K/UL — SIGNIFICANT CHANGE UP (ref 1.8–7.4)
NEUTROPHILS NFR BLD AUTO: 62.9 % — SIGNIFICANT CHANGE UP (ref 43–77)
NRBC # BLD: 0 /100 WBCS — SIGNIFICANT CHANGE UP (ref 0–0)
PLATELET # BLD AUTO: 104 K/UL — LOW (ref 150–400)
POTASSIUM SERPL-MCNC: 4.4 MMOL/L — SIGNIFICANT CHANGE UP (ref 3.5–5.3)
POTASSIUM SERPL-SCNC: 4.4 MMOL/L — SIGNIFICANT CHANGE UP (ref 3.5–5.3)
RBC # BLD: 3.02 M/UL — LOW (ref 3.8–5.2)
RBC # FLD: 13.9 % — SIGNIFICANT CHANGE UP (ref 10.3–14.5)
SODIUM SERPL-SCNC: 140 MMOL/L — SIGNIFICANT CHANGE UP (ref 135–145)
WBC # BLD: 6.93 K/UL — SIGNIFICANT CHANGE UP (ref 3.8–10.5)
WBC # FLD AUTO: 6.93 K/UL — SIGNIFICANT CHANGE UP (ref 3.8–10.5)

## 2018-12-14 PROCEDURE — 80053 COMPREHEN METABOLIC PANEL: CPT

## 2018-12-14 PROCEDURE — 86850 RBC ANTIBODY SCREEN: CPT

## 2018-12-14 PROCEDURE — 84100 ASSAY OF PHOSPHORUS: CPT

## 2018-12-14 PROCEDURE — 97162 PT EVAL MOD COMPLEX 30 MIN: CPT

## 2018-12-14 PROCEDURE — 93005 ELECTROCARDIOGRAM TRACING: CPT

## 2018-12-14 PROCEDURE — 88312 SPECIAL STAINS GROUP 1: CPT

## 2018-12-14 PROCEDURE — 82272 OCCULT BLD FECES 1-3 TESTS: CPT

## 2018-12-14 PROCEDURE — 85610 PROTHROMBIN TIME: CPT

## 2018-12-14 PROCEDURE — 36415 COLL VENOUS BLD VENIPUNCTURE: CPT

## 2018-12-14 PROCEDURE — 80048 BASIC METABOLIC PNL TOTAL CA: CPT

## 2018-12-14 PROCEDURE — 99285 EMERGENCY DEPT VISIT HI MDM: CPT | Mod: 25

## 2018-12-14 PROCEDURE — 84443 ASSAY THYROID STIM HORMONE: CPT

## 2018-12-14 PROCEDURE — 99232 SBSQ HOSP IP/OBS MODERATE 35: CPT

## 2018-12-14 PROCEDURE — 83036 HEMOGLOBIN GLYCOSYLATED A1C: CPT

## 2018-12-14 PROCEDURE — 97116 GAIT TRAINING THERAPY: CPT

## 2018-12-14 PROCEDURE — 97530 THERAPEUTIC ACTIVITIES: CPT

## 2018-12-14 PROCEDURE — 83880 ASSAY OF NATRIURETIC PEPTIDE: CPT

## 2018-12-14 PROCEDURE — 71045 X-RAY EXAM CHEST 1 VIEW: CPT

## 2018-12-14 PROCEDURE — 82962 GLUCOSE BLOOD TEST: CPT

## 2018-12-14 PROCEDURE — 86900 BLOOD TYPING SEROLOGIC ABO: CPT

## 2018-12-14 PROCEDURE — 83735 ASSAY OF MAGNESIUM: CPT

## 2018-12-14 PROCEDURE — 85027 COMPLETE CBC AUTOMATED: CPT

## 2018-12-14 PROCEDURE — 88305 TISSUE EXAM BY PATHOLOGIST: CPT

## 2018-12-14 PROCEDURE — 86901 BLOOD TYPING SEROLOGIC RH(D): CPT

## 2018-12-14 RX ORDER — CLOPIDOGREL BISULFATE 75 MG/1
1 TABLET, FILM COATED ORAL
Qty: 0 | Refills: 0 | COMMUNITY

## 2018-12-14 RX ADMIN — RISPERIDONE 0.25 MILLIGRAM(S): 4 TABLET ORAL at 05:23

## 2018-12-14 RX ADMIN — Medication 50 MILLIGRAM(S): at 05:23

## 2018-12-14 RX ADMIN — QUETIAPINE FUMARATE 50 MILLIGRAM(S): 200 TABLET, FILM COATED ORAL at 08:34

## 2018-12-14 RX ADMIN — Medication 1 DROP(S): at 05:23

## 2018-12-14 NOTE — PROGRESS NOTE ADULT - REASON FOR ADMISSION
rectal  bleeding

## 2018-12-14 NOTE — PROGRESS NOTE ADULT - SUBJECTIVE AND OBJECTIVE BOX
PCP  Subjective:   in chair , dtr at bed side , patient eating break fast ,     Objective:   Vital Signs Last 24 Hrs  T(C): 36.3 (12-14-18 @ 05:28), Max: 36.6 (12-13-18 @ 20:47)  T(F): 97.3 (12-14-18 @ 05:28), Max: 97.9 (12-13-18 @ 20:47)  HR: 73 (12-14-18 @ 05:28) (68 - 74)  BP: 120/58 (12-14-18 @ 05:28) (120/58 - 129/88)  BP(mean): --  RR: 16 (12-14-18 @ 05:28) (16 - 16)  SpO2: 91% (12-14-18 @ 05:28) (91% - 95%)  Daily     Daily        GENERAL:  wdwn obese female , awake , alert responsive , but confused , mild sob   EYES: eomi michele  NECK: supple no mass  CHEST/LUNG: clear , but poor air movement  HEART: s1 s2 regular   ABDOMEN:  soft nt obese , + bs, obese ,   EXTREMITIES: no edema   SKIN:  warm ,   CNS:  awake , alert , non focal , confused , weak , immobility issues due to morbid obesity  MSK:  able to move and ambulate with walker ,         Allergies: Allergies    No Known Allergies    Intolerances        Home Medications:  allopurinol 100 mg oral tablet: 1 tab(s) orally once a day (08 Dec 2018 19:32)  ALPRAZolam 0.25 mg oral tablet: 1 tab(s) orally 3 times a day, As needed, anxiety (13 Dec 2018 15:40)  citalopram 10 mg oral tablet: 1 tab(s) orally once a day (08 Dec 2018 19:32)  clopidogrel 75 mg oral tablet: 1 tab(s) orally once a day  start after january 1st, 2019 (13 Dec 2018 15:40)  ferrous sulfate (as elemental iron) 45 mg oral tablet, extended release: 1 tab(s) orally once a day (08 Dec 2018 19:32)  folic acid 1 mg oral tablet: 1 tab(s) orally once a day (08 Dec 2018 19:32)  furosemide 20 mg oral tablet: 1 tab(s) orally once a day (08 Dec 2018 19:32)  Klor-Con 20 mEq oral powder for reconstitution:  orally once a day (08 Dec 2018 19:32)  levothyroxine 100 mcg (0.1 mg) oral tablet: 1 tab(s) orally once a day (08 Dec 2018 19:32)  lisinopril 10 mg oral tablet: 1 tab(s) orally once a day (08 Dec 2018 19:32)  melatonin 3 mg oral tablet: 1 tab(s) orally once a day (at bedtime) (12 Dec 2018 08:41)  metoprolol succinate 50 mg oral tablet, extended release: 1 tab(s) orally once a day (13 Dec 2018 15:40)  metoprolol succinate 50 mg oral tablet, extended release: 1 tab(s) orally once a day (12 Dec 2018 08:45)  Namzaric 28 mg-10 mg oral capsule, extended release: 1 cap(s) orally once a day (08 Dec 2018 19:32)  pantoprazole 40 mg oral delayed release tablet: 1 tab(s) orally once a day (12 Dec 2018 08:41)  QUEtiapine 200 mg oral tablet: 1 tab(s) orally once a day (at bedtime) (08 Dec 2018 19:32)  QUEtiapine 50 mg oral tablet: 1 tab(s) orally 2 times a day with breakfast and lunch (08 Dec 2018 19:32)  risperiDONE 0.25 mg oral tablet: 1 tab(s) orally 2 times a day (12 Dec 2018 08:41)  simvastatin 40 mg oral tablet: 1 tab(s) orally once a day (at bedtime) (08 Dec 2018 19:32)  Trulicity Pen 0.75 mg/0.5 mL subcutaneous solution: subcutaneous once a week (08 Dec 2018 19:32)  Vitamin B-100 oral tablet: 1 tab(s) orally once a day (08 Dec 2018 19:32)    Medications:   ALPRAZolam 0.25 milliGRAM(s) Oral three times a day PRN  dextrose 40% Gel 15 Gram(s) Oral once PRN  dextrose 5%. 1000 milliLiter(s) IV Continuous <Continuous>  dextrose 50% Injectable 12.5 Gram(s) IV Push once  dextrose 50% Injectable 25 Gram(s) IV Push once  dextrose 50% Injectable 25 Gram(s) IV Push once  folic acid 1 milliGRAM(s) Oral daily  glucagon  Injectable 1 milliGRAM(s) IntraMuscular once PRN  insulin lispro (HumaLOG) corrective regimen sliding scale   SubCutaneous three times a day before meals  melatonin 3 milliGRAM(s) Oral at bedtime  metoprolol succinate ER 50 milliGRAM(s) Oral daily  pantoprazole  Injectable 40 milliGRAM(s) IV Push daily  QUEtiapine 50 milliGRAM(s) Oral two times a day  QUEtiapine 100 milliGRAM(s) Oral at bedtime  risperiDONE   Tablet 0.25 milliGRAM(s) Oral two times a day  sodium chloride 0.9%. 1000 milliLiter(s) IV Continuous <Continuous>  tobramycin 0.3% Ophthalmic Solution 1 Drop(s) Both EYES four times a day      LABS:                        9.7    7.74  )-----------( 129      ( 13 Dec 2018 08:00 )             29.9     12-13    141  |  108  |  49<H>  ----------------------------<  86  4.4   |  24  |  1.70<H>    Ca    7.7<L>      13 Dec 2018 08:00  Phos  4.3     12-12  Mg     2.0     12-12              CAPILLARY BLOOD GLUCOSE      POCT Blood Glucose.: 117 mg/dL (14 Dec 2018 07:36)  POCT Blood Glucose.: 138 mg/dL (13 Dec 2018 21:36)  POCT Blood Glucose.: 97 mg/dL (13 Dec 2018 16:43)  POCT Blood Glucose.: 164 mg/dL (13 Dec 2018 11:11)          RECENT CULTURES:

## 2018-12-14 NOTE — PROGRESS NOTE ADULT - PROBLEM SELECTOR PROBLEM 9
Osteoarthritis of multiple joints, unspecified osteoarthritis type

## 2018-12-14 NOTE — PROGRESS NOTE ADULT - PROBLEM SELECTOR PROBLEM 4
Type 2 diabetes mellitus with other circulatory complication

## 2018-12-14 NOTE — PROGRESS NOTE ADULT - PROBLEM SELECTOR PROBLEM 5
Diabetic nephropathy associated with type 2 diabetes mellitus

## 2018-12-14 NOTE — PROGRESS NOTE ADULT - PROBLEM SELECTOR PROBLEM 3
Renovascular hypertension
Dementia
Renovascular hypertension

## 2018-12-14 NOTE — PROGRESS NOTE ADULT - ASSESSMENT
82 yo f pmx htn, dm, hypothyroidism, ckd, varicoses vein, dementia admitted for rectal bleeding.  Called for 2 episodes of sustained tachycardia into 130/140s early last night and this morning.  Have now resolved, HR now steady in 95s.    - No further episodes of tachycardia overnight.    - C/W BB  - Unsure why she is on Plavix, but may continue to hold Plavix in setting of bleed.   - FOBT positive, for EDG/colonoscopy.  No contraindication for planned procedure from cardiac standpoint.  - No clear evidence of acute ischemia or volume overload  - Continue statin  - Non pitting edema on left ankle/foot, has had in the past, on lasix at home, continue to hold for now in setting of dehydration  - No acute changes on EKG compared to previous  - No previous echo done, never seen cardiologist outpt, no other significant cardiac hx  - Agree to hold ACEI for episodes of hypotension.  May reintroduce when more stable  - Monitor and replete lytes, keep K>4, Mg>2.  - Other cardiovascular workup will depend on clinical course.  - All other workup per primary team.  - Will follow.   Alfred Schneider ANP  Cardiology    Alfred Schneider ANP  Cardiology 82 yo f pmx htn, dm, hypothyroidism, ckd, varicoses vein, dementia admitted for rectal bleeding.  Called for 2 episodes of sustained tachycardia into 130/140s early last night and this morning.  Have now resolved, HR now steady in 95s.    - No further episodes of tachycardia overnight.    - C/W BB  - Unsure why she is on Plavix, but may continue to hold Plavix in setting of bleed.      - No clear evidence of acute ischemia or volume overload  - Continue statin  - Non pitting edema on left ankle/foot, has had in the past, on lasix at home, continue to hold for now in setting of dehydration  - No acute changes on EKG compared to previous  - No previous echo done, never seen cardiologist outpt, no other significant cardiac hx  - Agree to hold ACEI for episodes of hypotension.  May reintroduce when more stable  - Monitor and replete lytes, keep K>4, Mg>2.  - Other cardiovascular workup will depend on clinical course.  - All other workup per primary team.  - Will follow.      Alfred Schneider ANP  Cardiology

## 2018-12-14 NOTE — PROGRESS NOTE ADULT - PROBLEM SELECTOR PROBLEM 7
Bipolar affective disorder, currently manic, moderate

## 2018-12-14 NOTE — PROGRESS NOTE ADULT - PROBLEM SELECTOR PROBLEM 2
Vascular dementia without behavioral disturbance
Diabetes mellitus
Vascular dementia without behavioral disturbance

## 2018-12-14 NOTE — PROGRESS NOTE ADULT - PROBLEM SELECTOR PLAN 7
risperidone and Seroquel
risperidone and Seroquel
respiridone and seroquel
risperidone and Seroquel
risperidone and Seroquel

## 2018-12-14 NOTE — PROGRESS NOTE ADULT - SUBJECTIVE AND OBJECTIVE BOX
INTERVAL HPI/OVERNIGHT EVENTS:  pt seen and examined  oob in chair, confused, but denies abd pain  per overnight rn no s/s active gib  afebrile overnight labs pending    MEDICATIONS  (STANDING):  dextrose 5%. 1000 milliLiter(s) (50 mL/Hr) IV Continuous <Continuous>  dextrose 50% Injectable 12.5 Gram(s) IV Push once  dextrose 50% Injectable 25 Gram(s) IV Push once  dextrose 50% Injectable 25 Gram(s) IV Push once  folic acid 1 milliGRAM(s) Oral daily  insulin lispro (HumaLOG) corrective regimen sliding scale   SubCutaneous three times a day before meals  melatonin 3 milliGRAM(s) Oral at bedtime  metoprolol succinate ER 50 milliGRAM(s) Oral daily  pantoprazole  Injectable 40 milliGRAM(s) IV Push daily  QUEtiapine 50 milliGRAM(s) Oral two times a day  QUEtiapine 100 milliGRAM(s) Oral at bedtime  risperiDONE   Tablet 0.25 milliGRAM(s) Oral two times a day  sodium chloride 0.9%. 1000 milliLiter(s) (50 mL/Hr) IV Continuous <Continuous>  tobramycin 0.3% Ophthalmic Solution 1 Drop(s) Both EYES four times a day    MEDICATIONS  (PRN):  ALPRAZolam 0.25 milliGRAM(s) Oral three times a day PRN anxiety  dextrose 40% Gel 15 Gram(s) Oral once PRN Blood Glucose LESS THAN 70 milliGRAM(s)/deciliter  glucagon  Injectable 1 milliGRAM(s) IntraMuscular once PRN Glucose LESS THAN 70 milligrams/deciliter      Allergies    No Known Allergies    Intolerances        Review of Systems:    unable to obtain in entirety      Vital Signs Last 24 Hrs  T(C): 36.3 (14 Dec 2018 05:28), Max: 36.6 (13 Dec 2018 20:47)  T(F): 97.3 (14 Dec 2018 05:28), Max: 97.9 (13 Dec 2018 20:47)  HR: 73 (14 Dec 2018 05:28) (68 - 74)  BP: 120/58 (14 Dec 2018 05:28) (120/58 - 129/88)  BP(mean): --  RR: 16 (14 Dec 2018 05:28) (16 - 16)  SpO2: 91% (14 Dec 2018 05:28) (91% - 95%)    PHYSICAL EXAM:      Constitutional: NAD oob in chair  HEENT: ncat  Neck: No LAD  Respiratory: dec bs  Cardiovascular: S1 and S2, RRR  Gastrointestinal: obese soft nt nd  Extremities: edema  Vascular: 2+ peripheral pulses  Neurological: Awake alert some confusion  Skin: No rashes      LABS:                        9.7    7.74  )-----------( 129      ( 13 Dec 2018 08:00 )             29.9     12-13    141  |  108  |  49<H>  ----------------------------<  86  4.4   |  24  |  1.70<H>    Ca    7.7<L>      13 Dec 2018 08:00  Phos  4.3     12-12  Mg     2.0     12-12            RADIOLOGY & ADDITIONAL TESTS:

## 2018-12-14 NOTE — PROGRESS NOTE ADULT - ATTENDING COMMENTS
Chart reviewed    Patient seen and examined    Agree with plan as outlined above
Advanced care planning was discussed with patient and family.  Advanced care planning forms were reviewed and discussed.  Risks, benefits and alternatives of gastroenterologic procedures were discussed in detail and all questions were answered.    30 minutes spent.
Advanced care planning was discussed with patient and family.  Advanced care planning forms were reviewed and discussed.  Risks, benefits and alternatives of gastroenterologic procedures were discussed in detail and all questions were answered.    30 minutes spent.
Seen/examined. Agree with above.
The patient was personally seen and examined, in addition to being examined and evaluated by NP.  All elements of the note were edited where appropriate.
I saw and examined the patient personally. Spoke with above provider regarding this case. I reviewed the above findings completely.  I agree with the above history, physical, and plan which I have edited where appropriate.
patient case reviewed with dtr and agree to molst and dnr and dni ,

## 2018-12-14 NOTE — PROGRESS NOTE ADULT - PROBLEM SELECTOR PLAN 4
fs and coverage hga1c stable

## 2018-12-14 NOTE — PROGRESS NOTE ADULT - PROVIDER SPECIALTY LIST ADULT
Cardiology
Gastroenterology
Internal Medicine

## 2018-12-14 NOTE — PROGRESS NOTE ADULT - SUBJECTIVE AND OBJECTIVE BOX
[facility] -- Preeti Ortiz, Eugenia Newby, Medardo Coats Savella  Office # [phone]      Follow Up:    Tachy/Preop Eval  Subjective/Observations:   No events overnight resting comfortably in bed without complaints     REVIEW OF SYSTEMS: All other review of systems is negative unless indicated above    PAST MEDICAL & SURGICAL HISTORY:  Hypothyroidism  Hypertension  Diabetes mellitus  S/P knee replacement, bilateral      MEDICATIONS  (STANDING):  dextrose 5%. 1000 milliLiter(s) (50 mL/Hr) IV Continuous <Continuous>  dextrose 50% Injectable 12.5 Gram(s) IV Push once  dextrose 50% Injectable 25 Gram(s) IV Push once  dextrose 50% Injectable 25 Gram(s) IV Push once  folic acid 1 milliGRAM(s) Oral daily  insulin lispro (HumaLOG) corrective regimen sliding scale   SubCutaneous three times a day before meals  melatonin 3 milliGRAM(s) Oral at bedtime  metoprolol succinate ER 50 milliGRAM(s) Oral daily  pantoprazole  Injectable 40 milliGRAM(s) IV Push daily  QUEtiapine 50 milliGRAM(s) Oral two times a day  QUEtiapine 100 milliGRAM(s) Oral at bedtime  risperiDONE   Tablet 0.25 milliGRAM(s) Oral two times a day  sodium chloride 0.9%. 1000 milliLiter(s) (50 mL/Hr) IV Continuous <Continuous>    MEDICATIONS  (PRN):  ALPRAZolam 0.25 milliGRAM(s) Oral three times a day PRN anxiety  dextrose 40% Gel 15 Gram(s) Oral once PRN Blood Glucose LESS THAN 70 milliGRAM(s)/deciliter  glucagon  Injectable 1 milliGRAM(s) IntraMuscular once PRN Glucose LESS THAN 70 milligrams/deciliter      Allergies    No Known Allergies    Intolerances        Vital Signs Last 24 Hrs  T(C): 36.3 (14 Dec 2018 05:28), Max: 36.6 (13 Dec 2018 20:47)  T(F): 97.3 (14 Dec 2018 05:28), Max: 97.9 (13 Dec 2018 20:47)  HR: 73 (14 Dec 2018 05:28) (68 - 74)  BP: 120/58 (14 Dec 2018 05:28) (120/58 - 129/88)  BP(mean): --  RR: 16 (14 Dec 2018 05:28) (16 - 16)  SpO2: 91% (14 Dec 2018 05:28) (91% - 95%)    I&O's Summary        PHYSICAL EXAM:  TELE: NSR  Constitutional: NAD, awake and alert, well-developed  HEENT: Moist Mucous Membranes, Anicteric  Pulmonary: Non-labored, breath sounds are clear bilaterally, No wheezing, crackles or rhonchi  Cardiovascular: Regular, S1 and S2 nl, No murmurs, rubs, gallops or clicks  Gastrointestinal: Bowel Sounds present, soft, nontender.   Lymph: No lymphadenopathy. No peripheral edema.  Skin: No visible rashes or ulcers.  Psych:  Mood & affect appropriate    LABS: All Labs Reviewed:                        10.0   6.93  )-----------( 104      ( 14 Dec 2018 08:30 )             31.4                         9.7    7.74  )-----------( 129      ( 13 Dec 2018 08:00 )             29.9     14 Dec 2018 08:30    140    |  109    |  39     ----------------------------<  114    4.4     |  24     |  1.50   13 Dec 2018 08:00    141    |  108    |  49     ----------------------------<  86     4.4     |  24     |  1.70     Ca    8.0        14 Dec 2018 08:30  Ca    7.7        13 Dec 2018 08:00  Phos  4.3       12 Dec 2018 10:19  Mg     2.0       12 Dec 2018 10:19               ECG:  < from: 12 Lead ECG (12.11.18 @ 08:47) >  Ventricular Rate 133 BPM    Atrial Rate 133 BPM    P-R Interval 222 ms    QRS Duration 72 ms    Q-T Interval 304 ms    QTC Calculation(Bezet) 452 ms    P Axis 23 degrees    R Axis 24 degrees    T Axis 93 degrees    Diagnosis Line Sinus tachycardia with 1st degree AV block  vs long rp tachycardia  Low voltage QRS  Cannot rule out Anteroseptal infarct (cited on or before 08-DEC-2018)  Abnormal ECG  When compared with ECG of 10-DEC-2018 12:53,  ST no longer elevated in Inferior leads  Confirmed byAnival Bello MD ([id number]) on 12/11/2018 11:40:59 AM    < end of copied text >    Echo:    Radiology:  < from: Xray Chest 1 View- PORTABLE-Urgent (12.08.18 @ 14:42) >  EXAM:  XR CHEST PORTABLE URGENT 1V                            PROCEDURE DATE:  12/08/2018          INTERPRETATION:  Clinical History: Rectal bleed.    Comparison: None.      Findings:  The heart and mediastinal contours are normal. No segmental   infiltrates or effusions are observed. No pneumothorax is noted.       IMPRESSION:   1. Negative Chest.                DALLAS LEMONS M.D., ATTENDING RADIOLOGIST  This document has been electronically signed. Dec  8 2018  3:03PM    < end of copied text >           Alfred Schneider ANP   Cardiology [facility] -- Preeti Ortiz, Eugenia Newby, Medardo Coats Savella  Office # [phone]      Follow Up:    Tachy/Preop Eval  Subjective/Observations:   No events overnight resting comfortably in bed without complaints     REVIEW OF SYSTEMS: All other review of systems is negative unless indicated above    PAST MEDICAL & SURGICAL HISTORY:  Hypothyroidism  Hypertension  Diabetes mellitus  S/P knee replacement, bilateral      MEDICATIONS  (STANDING):  dextrose 5%. 1000 milliLiter(s) (50 mL/Hr) IV Continuous <Continuous>  dextrose 50% Injectable 12.5 Gram(s) IV Push once  dextrose 50% Injectable 25 Gram(s) IV Push once  dextrose 50% Injectable 25 Gram(s) IV Push once  folic acid 1 milliGRAM(s) Oral daily  insulin lispro (HumaLOG) corrective regimen sliding scale   SubCutaneous three times a day before meals  melatonin 3 milliGRAM(s) Oral at bedtime  metoprolol succinate ER 50 milliGRAM(s) Oral daily  pantoprazole  Injectable 40 milliGRAM(s) IV Push daily  QUEtiapine 50 milliGRAM(s) Oral two times a day  QUEtiapine 100 milliGRAM(s) Oral at bedtime  risperiDONE   Tablet 0.25 milliGRAM(s) Oral two times a day  sodium chloride 0.9%. 1000 milliLiter(s) (50 mL/Hr) IV Continuous <Continuous>    MEDICATIONS  (PRN):  ALPRAZolam 0.25 milliGRAM(s) Oral three times a day PRN anxiety  dextrose 40% Gel 15 Gram(s) Oral once PRN Blood Glucose LESS THAN 70 milliGRAM(s)/deciliter  glucagon  Injectable 1 milliGRAM(s) IntraMuscular once PRN Glucose LESS THAN 70 milligrams/deciliter      Allergies    No Known Allergies    Intolerances        Vital Signs Last 24 Hrs  T(C): 36.3 (14 Dec 2018 05:28), Max: 36.6 (13 Dec 2018 20:47)  T(F): 97.3 (14 Dec 2018 05:28), Max: 97.9 (13 Dec 2018 20:47)  HR: 73 (14 Dec 2018 05:28) (68 - 74)  BP: 120/58 (14 Dec 2018 05:28) (120/58 - 129/88)  BP(mean): --  RR: 16 (14 Dec 2018 05:28) (16 - 16)  SpO2: 91% (14 Dec 2018 05:28) (91% - 95%)    I&O's Summary        PHYSICAL EXAM:  TELE: NSR  Constitutional: NAD, awake and alert, well-developed  HEENT: Moist Mucous Membranes, Anicteric  Pulmonary: Decreased breath sounds b/l. No rales, crackles or wheeze appreciated.     Cardiovascular: Regular, S1 and S2 nl, No murmurs, rubs, gallops or clicks  Gastrointestinal: Bowel Sounds present, soft, nontender.   Lymph: No lymphadenopathy. trace peripheral edema.  Skin: No visible rashes or ulcers.  Psych:  Mood & affect appropriate    LABS: All Labs Reviewed:                        10.0   6.93  )-----------( 104      ( 14 Dec 2018 08:30 )             31.4                         9.7    7.74  )-----------( 129      ( 13 Dec 2018 08:00 )             29.9     14 Dec 2018 08:30    140    |  109    |  39     ----------------------------<  114    4.4     |  24     |  1.50   13 Dec 2018 08:00    141    |  108    |  49     ----------------------------<  86     4.4     |  24     |  1.70     Ca    8.0        14 Dec 2018 08:30  Ca    7.7        13 Dec 2018 08:00  Phos  4.3       12 Dec 2018 10:19  Mg     2.0       12 Dec 2018 10:19               ECG:  < from: 12 Lead ECG (12.11.18 @ 08:47) >  Ventricular Rate 133 BPM    Atrial Rate 133 BPM    P-R Interval 222 ms    QRS Duration 72 ms    Q-T Interval 304 ms    QTC Calculation(Bezet) 452 ms    P Axis 23 degrees    R Axis 24 degrees    T Axis 93 degrees    Diagnosis Line Sinus tachycardia with 1st degree AV block  vs long rp tachycardia  Low voltage QRS  Cannot rule out Anteroseptal infarct (cited on or before 08-DEC-2018)  Abnormal ECG  When compared with ECG of 10-DEC-2018 12:53,  ST no longer elevated in Inferior leads  Confirmed byAnival Bello MD ([id number]) on 12/11/2018 11:40:59 AM    < end of copied text >    Echo:    Radiology:  < from: Xray Chest 1 View- PORTABLE-Urgent (12.08.18 @ 14:42) >  EXAM:  XR CHEST PORTABLE URGENT 1V                            PROCEDURE DATE:  12/08/2018          INTERPRETATION:  Clinical History: Rectal bleed.    Comparison: None.      Findings:  The heart and mediastinal contours are normal. No segmental   infiltrates or effusions are observed. No pneumothorax is noted.       IMPRESSION:   1. Negative Chest.                DALLAS LEMONS M.D., ATTENDING RADIOLOGIST  This document has been electronically signed. Dec  8 2018  3:03PM    < end of copied text >           Alfred Schneider ANP   Cardiology

## 2018-12-14 NOTE — PROGRESS NOTE ADULT - PROBLEM SELECTOR PLAN 1
resolved, am labs pending  sp egd/colon- findings of small hh, gastritis, fluid seen within the colon (liquid stool), and small internal hemorrhoids  no further s/s gib per nursing  trend h/h, transfuse prn  cont ppi  anusol prn  diet as tolerated  dc planning per primary team

## 2018-12-14 NOTE — PROGRESS NOTE ADULT - ASSESSMENT
· Chief Complaint: The patient is a 83y Female complaining of rectal bleeding.	  · Unable to Obtain: Dementia	  · HPI Objective Statement: 83 female presents to ER with daughters and home aide with report of dark blood in the stool. Patient had BM today and aide noted the blood in the toilet. Patient has dementia, poor historian, history obtained from the daughter.	    PAST MEDICAL/SURGICAL/FAMILY/SOCIAL HISTORY:    Past Medical History:  Diabetes mellitus    Hypertension    Hypothyroidism.  obesity  Phlebitis   varicose veins     81 yo f,  , lives with 24/7 HHA and dtr helps out , has hx of obesity , diabetes mellitus type 2 , ckd stage 4,  bipolar , and anxiety , and varicose veins , who presents with rectal bleed , no abd pain , no nausea no vomiting ,   patient is dnr and dni per mols and ACP reviewed with dtr ,   admit and monitor for gi bleed and work up  on 12/11/18 patient ate BF , and feels ok over all , denies cp , mild sob , patient is tachycardic and order written for lopressor 5 mg , and nurse said that residents were not available to administer and I administered it slow iv push , checked with Telemetry and tachycardia broke at 9:12 am immediately after iv push lopressor and in NSR at about 80 bpm ,  plan colonoscopy , dtr aware , may need rehab , cardiology to f/u for HR issues ,   on 12/12/18 awaiting colonoscopy , patient may not have drank the prep completely , plan dc when stable hopefully by am with HHA  on 12/13/18 post colonoscopy , and egd , all stable , follow up labs , and plan dc home with HHA

## 2020-02-26 NOTE — ED PROVIDER NOTE - NSTIMEPROVIDERCAREINITIATE_GEN_ER
Addended by: ZEESHAN MADSEN on: 2/25/2020 07:33 PM     Modules accepted: Orders     08-Dec-2018 14:06
